# Patient Record
Sex: FEMALE | Employment: OTHER | URBAN - METROPOLITAN AREA
[De-identification: names, ages, dates, MRNs, and addresses within clinical notes are randomized per-mention and may not be internally consistent; named-entity substitution may affect disease eponyms.]

---

## 2017-07-14 ENCOUNTER — HOSPITAL ENCOUNTER (INPATIENT)
Facility: HOSPITAL | Age: 62
LOS: 8 days | Discharge: HOME | DRG: 286 | End: 2017-07-22
Attending: INTERNAL MEDICINE | Admitting: INTERNAL MEDICINE
Payer: COMMERCIAL

## 2017-07-14 DIAGNOSIS — I50.9 CHF (CONGESTIVE HEART FAILURE): ICD-10-CM

## 2017-07-14 DIAGNOSIS — J96.20 ACUTE ON CHRONIC RESPIRATORY FAILURE: ICD-10-CM

## 2017-07-14 DIAGNOSIS — F05 DELIRIUM DUE TO ANOTHER MEDICAL CONDITION, ACUTE, HYPOACTIVE: Primary | ICD-10-CM

## 2017-07-14 DIAGNOSIS — R53.1 WEAKNESS: ICD-10-CM

## 2017-07-14 DIAGNOSIS — J43.2 CENTRILOBULAR EMPHYSEMA: ICD-10-CM

## 2017-07-14 DIAGNOSIS — I27.20 PULMONARY HYPERTENSION: ICD-10-CM

## 2017-07-14 LAB
ALBUMIN SERPL BCP-MCNC: 2.9 G/DL
ALP SERPL-CCNC: 55 U/L
ALT SERPL W/O P-5'-P-CCNC: 20 U/L
ANION GAP SERPL CALC-SCNC: 8 MMOL/L
AST SERPL-CCNC: 12 U/L
BASOPHILS # BLD AUTO: 0 K/UL
BASOPHILS NFR BLD: 0 %
BILIRUB SERPL-MCNC: 0.5 MG/DL
BNP SERPL-MCNC: 157 PG/ML
BUN SERPL-MCNC: 23 MG/DL
CALCIUM SERPL-MCNC: 8.7 MG/DL
CHLORIDE SERPL-SCNC: 87 MMOL/L
CO2 SERPL-SCNC: 43 MMOL/L
CREAT SERPL-MCNC: 0.7 MG/DL
DIFFERENTIAL METHOD: ABNORMAL
EOSINOPHIL # BLD AUTO: 0 K/UL
EOSINOPHIL NFR BLD: 0 %
ERYTHROCYTE [DISTWIDTH] IN BLOOD BY AUTOMATED COUNT: 16.2 %
EST. GFR  (AFRICAN AMERICAN): >60 ML/MIN/1.73 M^2
EST. GFR  (NON AFRICAN AMERICAN): >60 ML/MIN/1.73 M^2
GLUCOSE SERPL-MCNC: 191 MG/DL
HCT VFR BLD AUTO: 37.4 %
HGB BLD-MCNC: 10.9 G/DL
INR PPP: 1.2
LYMPHOCYTES # BLD AUTO: 0.6 K/UL
LYMPHOCYTES NFR BLD: 3.1 %
MAGNESIUM SERPL-MCNC: 2.2 MG/DL
MCH RBC QN AUTO: 26.3 PG
MCHC RBC AUTO-ENTMCNC: 29.1 %
MCV RBC AUTO: 90 FL
MONOCYTES # BLD AUTO: 0.4 K/UL
MONOCYTES NFR BLD: 2.2 %
NEUTROPHILS # BLD AUTO: 18.1 K/UL
NEUTROPHILS NFR BLD: 94.2 %
PHOSPHATE SERPL-MCNC: 3.6 MG/DL
PLATELET # BLD AUTO: 192 K/UL
PMV BLD AUTO: 9.6 FL
POTASSIUM SERPL-SCNC: 3.7 MMOL/L
PROT SERPL-MCNC: 5.7 G/DL
PROTHROMBIN TIME: 12.2 SEC
RBC # BLD AUTO: 4.15 M/UL
SODIUM SERPL-SCNC: 138 MMOL/L
TROPONIN I SERPL DL<=0.01 NG/ML-MCNC: 0.02 NG/ML
WBC # BLD AUTO: 19.19 K/UL

## 2017-07-14 PROCEDURE — 85025 COMPLETE CBC W/AUTO DIFF WBC: CPT

## 2017-07-14 PROCEDURE — 84100 ASSAY OF PHOSPHORUS: CPT

## 2017-07-14 PROCEDURE — 85610 PROTHROMBIN TIME: CPT

## 2017-07-14 PROCEDURE — 25000003 PHARM REV CODE 250: Performed by: STUDENT IN AN ORGANIZED HEALTH CARE EDUCATION/TRAINING PROGRAM

## 2017-07-14 PROCEDURE — 84484 ASSAY OF TROPONIN QUANT: CPT

## 2017-07-14 PROCEDURE — 81003 URINALYSIS AUTO W/O SCOPE: CPT

## 2017-07-14 PROCEDURE — 93010 ELECTROCARDIOGRAM REPORT: CPT | Mod: ,,, | Performed by: INTERNAL MEDICINE

## 2017-07-14 PROCEDURE — 80053 COMPREHEN METABOLIC PANEL: CPT

## 2017-07-14 PROCEDURE — 83735 ASSAY OF MAGNESIUM: CPT

## 2017-07-14 PROCEDURE — 94761 N-INVAS EAR/PLS OXIMETRY MLT: CPT

## 2017-07-14 PROCEDURE — 63600175 PHARM REV CODE 636 W HCPCS: Performed by: STUDENT IN AN ORGANIZED HEALTH CARE EDUCATION/TRAINING PROGRAM

## 2017-07-14 PROCEDURE — 27000221 HC OXYGEN, UP TO 24 HOURS

## 2017-07-14 PROCEDURE — 20000000 HC ICU ROOM

## 2017-07-14 PROCEDURE — 83880 ASSAY OF NATRIURETIC PEPTIDE: CPT

## 2017-07-14 RX ORDER — SODIUM CHLORIDE 0.9 % (FLUSH) 0.9 %
3 SYRINGE (ML) INJECTION EVERY 8 HOURS
Status: DISCONTINUED | OUTPATIENT
Start: 2017-07-14 | End: 2017-07-22 | Stop reason: HOSPADM

## 2017-07-14 RX ORDER — HEPARIN SODIUM 5000 [USP'U]/ML
5000 INJECTION, SOLUTION INTRAVENOUS; SUBCUTANEOUS EVERY 8 HOURS
Status: DISCONTINUED | OUTPATIENT
Start: 2017-07-14 | End: 2017-07-16

## 2017-07-14 RX ORDER — POTASSIUM CHLORIDE 20 MEQ/1
40 TABLET, EXTENDED RELEASE ORAL ONCE
Status: COMPLETED | OUTPATIENT
Start: 2017-07-14 | End: 2017-07-14

## 2017-07-14 RX ORDER — ALBUTEROL SULFATE 0.83 MG/ML
2.5 SOLUTION RESPIRATORY (INHALATION) EVERY 4 HOURS
Status: DISCONTINUED | OUTPATIENT
Start: 2017-07-15 | End: 2017-07-22 | Stop reason: HOSPADM

## 2017-07-14 RX ORDER — FUROSEMIDE 10 MG/ML
80 INJECTION INTRAMUSCULAR; INTRAVENOUS ONCE
Status: COMPLETED | OUTPATIENT
Start: 2017-07-14 | End: 2017-07-14

## 2017-07-14 RX ADMIN — POTASSIUM CHLORIDE 40 MEQ: 1500 TABLET, EXTENDED RELEASE ORAL at 11:07

## 2017-07-14 RX ADMIN — HEPARIN SODIUM 5000 UNITS: 5000 INJECTION, SOLUTION INTRAVENOUS; SUBCUTANEOUS at 09:07

## 2017-07-14 RX ADMIN — FUROSEMIDE 80 MG: 10 INJECTION, SOLUTION INTRAVENOUS at 11:07

## 2017-07-14 RX ADMIN — FUROSEMIDE 10 MG/HR: 10 INJECTION, SOLUTION INTRAVENOUS at 11:07

## 2017-07-14 RX ADMIN — Medication 3 ML: at 09:07

## 2017-07-15 PROBLEM — R09.02 HYPOXIA: Status: ACTIVE | Noted: 2017-07-15

## 2017-07-15 PROBLEM — J43.2 CENTRILOBULAR EMPHYSEMA: Status: ACTIVE | Noted: 2017-07-15

## 2017-07-15 LAB
ALBUMIN SERPL BCP-MCNC: 3.2 G/DL
ALP SERPL-CCNC: 54 U/L
ALT SERPL W/O P-5'-P-CCNC: 22 U/L
ANION GAP SERPL CALC-SCNC: 11 MMOL/L
ANTI SM/RNP ANTIBODY: 0.24 EU
ANTI-SM/RNP INTERPRETATION: NEGATIVE
ANTI-SSA ANTIBODY: 0 EU
ANTI-SSA INTERPRETATION: NEGATIVE
ANTI-SSB ANTIBODY: 0 EU
ANTI-SSB INTERPRETATION: NEGATIVE
AST SERPL-CCNC: 15 U/L
BASOPHILS # BLD AUTO: 0.01 K/UL
BASOPHILS NFR BLD: 0.1 %
BILIRUB SERPL-MCNC: 0.6 MG/DL
BILIRUB UR QL STRIP: NEGATIVE
BUN SERPL-MCNC: 19 MG/DL
C3 SERPL-MCNC: 75 MG/DL
C4 SERPL-MCNC: 9 MG/DL
CALCIUM SERPL-MCNC: 7.9 MG/DL
CCP AB SER IA-ACNC: <0.5 U/ML
CHLORIDE SERPL-SCNC: 87 MMOL/L
CLARITY UR REFRACT.AUTO: CLEAR
CO2 SERPL-SCNC: 45 MMOL/L
COLOR UR AUTO: YELLOW
CREAT SERPL-MCNC: 0.6 MG/DL
DIFFERENTIAL METHOD: ABNORMAL
EOSINOPHIL # BLD AUTO: 0 K/UL
EOSINOPHIL NFR BLD: 0 %
ERYTHROCYTE [DISTWIDTH] IN BLOOD BY AUTOMATED COUNT: 16.2 %
EST. GFR  (AFRICAN AMERICAN): >60 ML/MIN/1.73 M^2
EST. GFR  (NON AFRICAN AMERICAN): >60 ML/MIN/1.73 M^2
ESTIMATED PA SYSTOLIC PRESSURE: 66.04
GLUCOSE SERPL-MCNC: 161 MG/DL
GLUCOSE UR QL STRIP: NEGATIVE
HCT VFR BLD AUTO: 39.8 %
HGB BLD-MCNC: 11.3 G/DL
HGB UR QL STRIP: NEGATIVE
KETONES UR QL STRIP: NEGATIVE
LEUKOCYTE ESTERASE UR QL STRIP: NEGATIVE
LYMPHOCYTES # BLD AUTO: 0.6 K/UL
LYMPHOCYTES NFR BLD: 4.1 %
MAGNESIUM SERPL-MCNC: 2.2 MG/DL
MCH RBC QN AUTO: 25.5 PG
MCHC RBC AUTO-ENTMCNC: 28.4 %
MCV RBC AUTO: 90 FL
MITRAL VALVE REGURGITATION: ABNORMAL
MONOCYTES # BLD AUTO: 0.4 K/UL
MONOCYTES NFR BLD: 2.8 %
NEUTROPHILS # BLD AUTO: 14.2 K/UL
NEUTROPHILS NFR BLD: 92.5 %
NITRITE UR QL STRIP: NEGATIVE
PH UR STRIP: 6 [PH] (ref 5–8)
PHOSPHATE SERPL-MCNC: 3.6 MG/DL
PLATELET # BLD AUTO: 187 K/UL
PMV BLD AUTO: 9.5 FL
POTASSIUM SERPL-SCNC: 3.6 MMOL/L
PROT SERPL-MCNC: 6.1 G/DL
PROT UR QL STRIP: NEGATIVE
RBC # BLD AUTO: 4.43 M/UL
RETIRED EF AND QEF - SEE NOTES: 55 (ref 55–65)
RHEUMATOID FACT SERPL-ACNC: <10 IU/ML
SODIUM SERPL-SCNC: 143 MMOL/L
SP GR UR STRIP: 1.01 (ref 1–1.03)
TRICUSPID VALVE REGURGITATION: ABNORMAL
URN SPEC COLLECT METH UR: NORMAL
UROBILINOGEN UR STRIP-ACNC: NEGATIVE EU/DL
WBC # BLD AUTO: 15.32 K/UL

## 2017-07-15 PROCEDURE — 94761 N-INVAS EAR/PLS OXIMETRY MLT: CPT

## 2017-07-15 PROCEDURE — 87040 BLOOD CULTURE FOR BACTERIA: CPT

## 2017-07-15 PROCEDURE — 25000003 PHARM REV CODE 250: Performed by: STUDENT IN AN ORGANIZED HEALTH CARE EDUCATION/TRAINING PROGRAM

## 2017-07-15 PROCEDURE — 99223 1ST HOSP IP/OBS HIGH 75: CPT | Mod: ,,, | Performed by: INTERNAL MEDICINE

## 2017-07-15 PROCEDURE — 99222 1ST HOSP IP/OBS MODERATE 55: CPT | Mod: ,,, | Performed by: INTERNAL MEDICINE

## 2017-07-15 PROCEDURE — 27000221 HC OXYGEN, UP TO 24 HOURS

## 2017-07-15 PROCEDURE — 86160 COMPLEMENT ANTIGEN: CPT | Mod: 59

## 2017-07-15 PROCEDURE — 20000000 HC ICU ROOM

## 2017-07-15 PROCEDURE — 36600 WITHDRAWAL OF ARTERIAL BLOOD: CPT

## 2017-07-15 PROCEDURE — 83735 ASSAY OF MAGNESIUM: CPT

## 2017-07-15 PROCEDURE — 25000003 PHARM REV CODE 250: Performed by: INTERNAL MEDICINE

## 2017-07-15 PROCEDURE — 86038 ANTINUCLEAR ANTIBODIES: CPT

## 2017-07-15 PROCEDURE — 83516 IMMUNOASSAY NONANTIBODY: CPT

## 2017-07-15 PROCEDURE — 94640 AIRWAY INHALATION TREATMENT: CPT

## 2017-07-15 PROCEDURE — 86200 CCP ANTIBODY: CPT

## 2017-07-15 PROCEDURE — 99900035 HC TECH TIME PER 15 MIN (STAT)

## 2017-07-15 PROCEDURE — 63600175 PHARM REV CODE 636 W HCPCS: Performed by: STUDENT IN AN ORGANIZED HEALTH CARE EDUCATION/TRAINING PROGRAM

## 2017-07-15 PROCEDURE — 94660 CPAP INITIATION&MGMT: CPT

## 2017-07-15 PROCEDURE — 86235 NUCLEAR ANTIGEN ANTIBODY: CPT | Mod: 59

## 2017-07-15 PROCEDURE — 80053 COMPREHEN METABOLIC PANEL: CPT

## 2017-07-15 PROCEDURE — 86235 NUCLEAR ANTIGEN ANTIBODY: CPT

## 2017-07-15 PROCEDURE — 36415 COLL VENOUS BLD VENIPUNCTURE: CPT

## 2017-07-15 PROCEDURE — 85025 COMPLETE CBC W/AUTO DIFF WBC: CPT

## 2017-07-15 PROCEDURE — 82803 BLOOD GASES ANY COMBINATION: CPT

## 2017-07-15 PROCEDURE — 86431 RHEUMATOID FACTOR QUANT: CPT

## 2017-07-15 PROCEDURE — 93306 TTE W/DOPPLER COMPLETE: CPT | Mod: 26,,, | Performed by: INTERNAL MEDICINE

## 2017-07-15 PROCEDURE — 84100 ASSAY OF PHOSPHORUS: CPT

## 2017-07-15 PROCEDURE — 86225 DNA ANTIBODY NATIVE: CPT

## 2017-07-15 PROCEDURE — 27000190 HC CPAP FULL FACE MASK W/VALVE

## 2017-07-15 PROCEDURE — 82595 ASSAY OF CRYOGLOBULIN: CPT

## 2017-07-15 PROCEDURE — 86160 COMPLEMENT ANTIGEN: CPT

## 2017-07-15 PROCEDURE — 93306 TTE W/DOPPLER COMPLETE: CPT

## 2017-07-15 PROCEDURE — 25000242 PHARM REV CODE 250 ALT 637 W/ HCPCS: Performed by: STUDENT IN AN ORGANIZED HEALTH CARE EDUCATION/TRAINING PROGRAM

## 2017-07-15 RX ORDER — FLUTICASONE PROPIONATE AND SALMETEROL 250; 50 UG/1; UG/1
1 POWDER RESPIRATORY (INHALATION) 2 TIMES DAILY
COMMUNITY

## 2017-07-15 RX ORDER — POTASSIUM CHLORIDE 20 MEQ/1
40 TABLET, EXTENDED RELEASE ORAL DAILY
Status: DISCONTINUED | OUTPATIENT
Start: 2017-07-15 | End: 2017-07-18

## 2017-07-15 RX ORDER — PREDNISONE 20 MG/1
20 TABLET ORAL DAILY
COMMUNITY

## 2017-07-15 RX ORDER — FUROSEMIDE 20 MG/1
20 TABLET ORAL 2 TIMES DAILY
Status: ON HOLD | COMMUNITY
End: 2017-07-22 | Stop reason: HOSPADM

## 2017-07-15 RX ORDER — FLUOXETINE 10 MG/1
10 CAPSULE ORAL DAILY
Status: ON HOLD | COMMUNITY
End: 2017-07-22 | Stop reason: HOSPADM

## 2017-07-15 RX ORDER — TIOTROPIUM BROMIDE 18 UG/1
1 CAPSULE ORAL; RESPIRATORY (INHALATION) DAILY
Status: DISCONTINUED | OUTPATIENT
Start: 2017-07-15 | End: 2017-07-22 | Stop reason: HOSPADM

## 2017-07-15 RX ORDER — TIOTROPIUM BROMIDE 18 UG/1
18 CAPSULE ORAL; RESPIRATORY (INHALATION) DAILY
COMMUNITY

## 2017-07-15 RX ORDER — ALBUTEROL SULFATE 90 UG/1
2 AEROSOL, METERED RESPIRATORY (INHALATION) EVERY 6 HOURS PRN
COMMUNITY

## 2017-07-15 RX ORDER — PREDNISONE 20 MG/1
20 TABLET ORAL DAILY
Status: DISCONTINUED | OUTPATIENT
Start: 2017-07-15 | End: 2017-07-15

## 2017-07-15 RX ORDER — FLUOXETINE 10 MG/1
10 CAPSULE ORAL DAILY
Status: DISCONTINUED | OUTPATIENT
Start: 2017-07-15 | End: 2017-07-21

## 2017-07-15 RX ORDER — FLUTICASONE FUROATE AND VILANTEROL 100; 25 UG/1; UG/1
1 POWDER RESPIRATORY (INHALATION) DAILY
Status: DISCONTINUED | OUTPATIENT
Start: 2017-07-15 | End: 2017-07-22 | Stop reason: HOSPADM

## 2017-07-15 RX ORDER — ALBUTEROL SULFATE 0.83 MG/ML
2.5 SOLUTION RESPIRATORY (INHALATION) EVERY 6 HOURS PRN
COMMUNITY

## 2017-07-15 RX ORDER — POTASSIUM CHLORIDE 20 MEQ/1
40 TABLET, EXTENDED RELEASE ORAL ONCE
Status: COMPLETED | OUTPATIENT
Start: 2017-07-15 | End: 2017-07-15

## 2017-07-15 RX ORDER — PREDNISONE 20 MG/1
20 TABLET ORAL DAILY
Status: DISCONTINUED | OUTPATIENT
Start: 2017-07-15 | End: 2017-07-22 | Stop reason: HOSPADM

## 2017-07-15 RX ADMIN — TIOTROPIUM BROMIDE 18 MCG: 18 CAPSULE ORAL; RESPIRATORY (INHALATION) at 08:07

## 2017-07-15 RX ADMIN — POTASSIUM CHLORIDE 40 MEQ: 1500 TABLET, EXTENDED RELEASE ORAL at 01:07

## 2017-07-15 RX ADMIN — HEPARIN SODIUM 5000 UNITS: 5000 INJECTION, SOLUTION INTRAVENOUS; SUBCUTANEOUS at 09:07

## 2017-07-15 RX ADMIN — ALBUTEROL SULFATE 2.5 MG: 2.5 SOLUTION RESPIRATORY (INHALATION) at 11:07

## 2017-07-15 RX ADMIN — VANCOMYCIN HYDROCHLORIDE 1000 MG: 1 INJECTION, POWDER, LYOPHILIZED, FOR SOLUTION INTRAVENOUS at 06:07

## 2017-07-15 RX ADMIN — ALBUTEROL SULFATE 2.5 MG: 2.5 SOLUTION RESPIRATORY (INHALATION) at 08:07

## 2017-07-15 RX ADMIN — HEPARIN SODIUM 5000 UNITS: 5000 INJECTION, SOLUTION INTRAVENOUS; SUBCUTANEOUS at 05:07

## 2017-07-15 RX ADMIN — FLUOXETINE 10 MG: 10 CAPSULE ORAL at 08:07

## 2017-07-15 RX ADMIN — PREDNISONE 20 MG: 20 TABLET ORAL at 08:07

## 2017-07-15 RX ADMIN — Medication 3 ML: at 09:07

## 2017-07-15 RX ADMIN — ALBUTEROL SULFATE 2.5 MG: 2.5 SOLUTION RESPIRATORY (INHALATION) at 12:07

## 2017-07-15 RX ADMIN — FLUTICASONE FUROATE AND VILANTEROL TRIFENATATE 1 PUFF: 100; 25 POWDER RESPIRATORY (INHALATION) at 08:07

## 2017-07-15 RX ADMIN — PIPERACILLIN AND TAZOBACTAM 4.5 G: 4; .5 INJECTION, POWDER, LYOPHILIZED, FOR SOLUTION INTRAVENOUS; PARENTERAL at 11:07

## 2017-07-15 RX ADMIN — ALBUTEROL SULFATE 2.5 MG: 2.5 SOLUTION RESPIRATORY (INHALATION) at 04:07

## 2017-07-15 RX ADMIN — Medication 3 ML: at 05:07

## 2017-07-15 RX ADMIN — HEPARIN SODIUM 5000 UNITS: 5000 INJECTION, SOLUTION INTRAVENOUS; SUBCUTANEOUS at 01:07

## 2017-07-15 RX ADMIN — POTASSIUM CHLORIDE 40 MEQ: 1500 TABLET, EXTENDED RELEASE ORAL at 06:07

## 2017-07-15 NOTE — SUBJECTIVE & OBJECTIVE
Past Medical History:   Diagnosis Date    Arthritis     Cancer     Cervical cancer 1980s - remission    COPD (chronic obstructive pulmonary disease)     3.5 L home O2 (for 8 years)    Emphysema, unspecified     Emphysema/COPD        Past Surgical History:   Procedure Laterality Date    TONSILLECTOMY         Review of patient's allergies indicates:  No Known Allergies    Family History     None        Social History Main Topics    Smoking status: Former Smoker     Types: Cigarettes    Smokeless tobacco: Never Used      Comment: Started at 20years old. Quit 15 years ago    Alcohol use Not on file    Drug use: No    Sexual activity: Not on file      Review of Systems     Positive findings are in BOLD. Otherwise negative ROS as below.     CONSTITUTIONAL: weight loss, fever, chills, weakness or fatigue.   HEENT: visual loss, blurred vision, double vision or yellow sclerae. Ears, Nose, Throat: No hearing loss, sneezing, congestion, runny nose or sore throat.   CARDIOVASCULAR: chest pain, chest pressure or chest discomfort. No palpitations or edema.   RESPIRATORY: shortness of breath, cough or sputum.   GASTROINTESTINAL:anorexia, nausea, vomiting or diarrhea. No abdominal pain or blood.   NEUROLOGICAL: headache, dizziness, syncope, paralysis, ataxia, numbness or tingling in the extremities. No change in bowel or bladder control.   MUSCULOSKELETAL: muscle, back pain, joint pain or stiffness.   HEMATOLOGIC: anemia, bleeding or bruising.   LYMPHATICS: enlarged nodes. No history of splenectomy.   PSYCHIATRIC: history of depression or anxiety.   ENDOCRINOLOGIC: No reports of sweating, cold or heat intolerance. No polyuria or polydipsia.       Objective:     Vital Signs (Most Recent):  Temp: 98.6 °F (37 °C) (07/15/17 1515)  Pulse: 96 (07/15/17 1515)  Resp: (!) 24 (07/15/17 1515)  BP: 123/63 (07/15/17 1515)  SpO2: 100 % (07/15/17 1515) Vital Signs (24h Range):  Temp:  [97.8 °F (36.6 °C)-98.6 °F (37 °C)] 98.6 °F (37  °C)  Pulse:  [] 96  Resp:  [15-62] 24  SpO2:  [96 %-100 %] 100 %  BP: ()/(58-78) 123/63   Weight: 70.5 kg (155 lb 6.8 oz)  Body mass index is 30.35 kg/m².      Intake/Output Summary (Last 24 hours) at 07/15/17 1547  Last data filed at 07/15/17 1515   Gross per 24 hour   Intake           950.57 ml   Output             3550 ml   Net         -2599.43 ml       Physical Exam     Gen: alert and oriented. On venti mask   HEENT: oral mucosa moist, free of lesions, no dental abnormalities, neck free of lymphadenopathy. JVD negative, no eye abnormalities. Pupils appears equal and reactive.   Chest: No wheezing.   Heart: RRR, No M/G/r.   Abdomen: soft, non tender, no masses. No g/r/r  Extremities: Moderate pitting edema noted bilaterally.        Significant Labs:    CBC/Anemia Profile:    Recent Labs  Lab 07/14/17  2050 07/15/17  0336   WBC 19.19* 15.32*   HGB 10.9* 11.3*   HCT 37.4 39.8    187   MCV 90 90   RDW 16.2* 16.2*        Chemistries:    Recent Labs  Lab 07/14/17  2050 07/15/17  0336    143   K 3.7 3.6   CL 87* 87*   CO2 43* 45*   BUN 23* 19   CREATININE 0.7 0.6   CALCIUM 8.7 7.9*   ALBUMIN 2.9* 3.2*   PROT 5.7* 6.1   BILITOT 0.5 0.6   ALKPHOS 55 54*   ALT 20 22   AST 12 15   MG 2.2 2.2   PHOS 3.6 3.6

## 2017-07-15 NOTE — HPI
Mrs. Black is a 62yo woman w/a history of HTN, COPD (tobacco abuse + industrial chemical exposure history; on home oxygen x 12yrs), subsequent cor pulmonale (recnetly diagnosed), DM2, and remote cervical cancer who was originally admitted to an OSH on 7/5/2017 with chills, productive cough (blood tinged), progressive SOB/hypoxia, and BLE edema due to multifocal pneumonia, COPD exacerbation, and HF exacerbation/new RV failure diagnosis. Prior sick contacts included her  with a viral URI in the prior week. She received a 10 day course of parenteral antibiotics (vanc/zosyn/LVQ/flagyl) at the OSH along with steroids for a RLL PNA/COPD exacerbation with improvement in her cough/sputum production but remained SOB, prompting transfer to [facility] on 7/14/2017 for further cardiac care of her R heart failure. ID has been consulted to comment on her antibiotics course for pneumonia. I reviewed her records from the OSH -- she has been afebrile with a downtrending but significant leukocytosis after steroid use for COPD exacerbation. Sputum cultures were not obtained at the OSH but blood cultures were. Her CXR is without lobar infiltrate at present and changes present seem well attributed to HF.

## 2017-07-15 NOTE — ASSESSMENT & PLAN NOTE
62yo woman w/a history of HTN, COPD (tobacco abuse + industrial chemical exposure history; on home oxygen x 12yrs), subsequent cor pulmonale (recnetly diagnosed), DM2, and remote cervical cancer who was originally admitted to an OSH on 7/5/2017 with chills, productive cough (blood tinged), progressive SOB/hypoxia, and BLE edema due to multifocal pneumonia, COPD exacerbation, and HF exacerbation/new RV failure diagnosis. She received an appropriate 10 day course of parenteral antibiotics (vanc/zosyn/LVQ/flagyl) at the OSH along with steroids for a RLL PNA/COPD exacerbation with improvement in her cough/sputum production and I suspect her residual symptoms are due to heart failure. As such, would stop antibiotics at this time.    - I have stopped her antibiotics since she has completed an appropriate course for pneumonia (HCAP)  - further care of her SOB deferred to primary team

## 2017-07-15 NOTE — CONSULTS
[facility]  Infectious Disease  Consult Note    Patient Name: Joy Black  MRN: [id number]  Admission Date: 7/14/2017  Hospital Length of Stay: 1 days  Attending Physician: Erich Medley Jr.,*  Primary Care Provider: Jim Hernandez MD     Isolation Status: No active isolations    Patient information was obtained from patient, spouse/SO and law enforcement.      Inpatient consult to Infectious Diseases  Consult performed by: ALANNAH CONWAY  Consult ordered by: HERMILO DINERO  Reason for consult: pneumonia rx        Assessment/Plan:     Hypoxia    60yo woman w/a history of HTN, COPD (tobacco abuse + industrial chemical exposure history; on home oxygen x 12yrs), subsequent cor pulmonale (recnetly diagnosed), DM2, and remote cervical cancer who was originally admitted to an OSH on 7/5/2017 with chills, productive cough (blood tinged), progressive SOB/hypoxia, and BLE edema due to multifocal pneumonia, COPD exacerbation, and HF exacerbation/new RV failure diagnosis. She received an appropriate 10 day course of parenteral antibiotics (vanc/zosyn/LVQ/flagyl) at the OSH along with steroids for a RLL PNA/COPD exacerbation with improvement in her cough/sputum production and I suspect her residual symptoms are due to heart failure. As such, would stop antibiotics at this time.    - I have stopped her antibiotics since she has completed an appropriate course for pneumonia (HCAP)  - further care of her SOB deferred to primary team            Thank you for your consult. I will sign off. Please contact us if you have any additional questions.     Alannah Conway MD  ID Attending  [phone]    Alannah Conway MD  Infectious Disease  [facility]    Subjective:     Principal Problem: <principal problem not specified>    HPI: Mrs. Black is a 60yo woman w/a history of HTN, COPD (tobacco abuse + industrial chemical exposure history; on home oxygen x 12yrs), subsequent cor  pulmonale (recnetly diagnosed), DM2, and remote cervical cancer who was originally admitted to an OSH on 7/5/2017 with chills, productive cough (blood tinged), progressive SOB/hypoxia, and BLE edema due to multifocal pneumonia, COPD exacerbation, and HF exacerbation/new RV failure diagnosis. Prior sick contacts included her  with a viral URI in the prior week. She received a 10 day course of parenteral antibiotics (vanc/zosyn/LVQ/flagyl) at the OSH along with steroids for a RLL PNA/COPD exacerbation with improvement in her cough/sputum production but remained SOB, prompting transfer to [facility] on 7/14/2017 for further cardiac care of her R heart failure. ID has been consulted to comment on her antibiotics course for pneumonia. I reviewed her records from the OSH -- she has been afebrile with a downtrending but significant leukocytosis after steroid use for COPD exacerbation. Sputum cultures were not obtained at the OSH but blood cultures were. Her CXR is without lobar infiltrate at present and changes present seem well attributed to HF.    Past Medical History:   Diagnosis Date    Arthritis     Cancer     Cervical cancer 1980s - remission    COPD (chronic obstructive pulmonary disease)     3.5 L home O2 (for 8 years)    Emphysema, unspecified     Emphysema/COPD        Past Surgical History:   Procedure Laterality Date    TONSILLECTOMY         Review of patient's allergies indicates:  No Known Allergies    Medications:  Prescriptions Prior to Admission   Medication Sig    albuterol (PROVENTIL) 2.5 mg /3 mL (0.083 %) nebulizer solution Take 2.5 mg by nebulization every 6 (six) hours as needed for Wheezing or Shortness of Breath. Rescue    albuterol (VENTOLIN HFA) 90 mcg/actuation inhaler Inhale 2 puffs into the lungs every 6 (six) hours as needed for Wheezing. Rescue    fluoxetine (PROZAC) 10 MG capsule Take 10 mg by mouth once daily.    fluticasone-salmeterol 250-50 mcg/dose (ADVAIR) 250-50 mcg/dose  diskus inhaler Inhale 1 puff into the lungs 2 (two) times daily. Controller    furosemide (LASIX) 20 MG tablet Take 20 mg by mouth 2 (two) times daily.    predniSONE (DELTASONE) 20 MG tablet Take 20 mg by mouth once daily.    tiotropium (SPIRIVA) 18 mcg inhalation capsule Inhale 18 mcg into the lungs once daily. Controller     Antibiotics     Start     Stop Route Frequency Ordered    07/15/17 0630  vancomycin 1 g in dextrose 5 % 250 mL IVPB (ready to mix system)  (Vancomycin IVPB with levels panel)      -- IV Every 12 hours (non-standard times) 07/15/17 0521    07/15/17 0521  piperacillin-tazobactam 4.5 g in dextrose 5 % 100 mL IVPB (ready to mix system)      -- IV Every 6 hours (non-standard times) 07/15/17 0521        Antifungals     None        Antivirals     None             There is no immunization history on file for this patient.    Family History     None        Social History     Social History    Marital status:      Spouse name: N/A    Number of children: N/A    Years of education: N/A     Social History Main Topics    Smoking status: Former Smoker     Types: Cigarettes    Smokeless tobacco: Never Used      Comment: Started at 20years old. Quit 15 years ago    Alcohol use None    Drug use: No    Sexual activity: Not Asked     Other Topics Concern    None     Social History Narrative    None     Review of Systems   Constitutional: Positive for activity change. Negative for appetite change, chills, diaphoresis and fever.   HENT: Negative for ear pain, mouth sores, sinus pressure and sore throat.    Eyes: Negative for photophobia, pain and redness.   Respiratory: Positive for shortness of breath. Negative for cough and wheezing.    Cardiovascular: Positive for leg swelling. Negative for chest pain.   Gastrointestinal: Negative for abdominal distention, abdominal pain, diarrhea and nausea.   Genitourinary: Negative for dysuria, flank pain, frequency and urgency.   Musculoskeletal: Negative  for arthralgias, back pain, gait problem and myalgias.   Skin: Negative for pallor and rash.   Neurological: Negative for dizziness, tremors, seizures and headaches.   Psychiatric/Behavioral: Negative for confusion.     Objective:     Vital Signs (Most Recent):  Temp: 98.6 °F (37 °C) (07/15/17 1515)  Pulse: 108 (07/15/17 1603)  Resp: 20 (07/15/17 1603)  BP: 123/63 (07/15/17 1515)  SpO2: 100 % (07/15/17 1603) Vital Signs (24h Range):  Temp:  [97.8 °F (36.6 °C)-98.6 °F (37 °C)] 98.6 °F (37 °C)  Pulse:  [] 108  Resp:  [15-62] 20  SpO2:  [96 %-100 %] 100 %  BP: ()/(58-78) 123/63     Weight: 70.5 kg (155 lb 6.8 oz)  Body mass index is 30.35 kg/m².    Estimated Creatinine Clearance: 88.5 mL/min (based on Cr of 0.6).    Physical Exam   Constitutional: She is oriented to person, place, and time. She appears well-developed.   Chronically ill appearing due to lung disease.   HENT:   Head: Atraumatic.   Mouth/Throat: Oropharynx is clear and moist. No oropharyngeal exudate.   Eyes: Conjunctivae and EOM are normal. Pupils are equal, round, and reactive to light. No scleral icterus.   Neck: Neck supple.   Cardiovascular: Normal rate and regular rhythm.  Exam reveals no friction rub.    Murmur heard.  Pulmonary/Chest: No respiratory distress. She has no wheezes. She has rales. She exhibits no tenderness.   Mildly tachypneic on face mask. Diffuse scant crackles and decreased in bases.   Abdominal: Soft. Bowel sounds are normal. She exhibits no distension. There is no tenderness. There is no rebound and no guarding.   Musculoskeletal: Normal range of motion. She exhibits edema.   Lymphadenopathy:     She has no cervical adenopathy.   Neurological: She is alert and oriented to person, place, and time. No cranial nerve deficit. She exhibits normal muscle tone. Coordination normal.   Skin: No rash noted. No erythema.       Significant Labs:   CBC:   Recent Labs  Lab 07/14/17  2050 07/15/17  0336   WBC 19.19* 15.32*   HGB  10.9* 11.3*   HCT 37.4 39.8    187     CMP:   Recent Labs  Lab 07/14/17  2050 07/15/17  0336    143   K 3.7 3.6   CL 87* 87*   CO2 43* 45*   * 161*   BUN 23* 19   CREATININE 0.7 0.6   CALCIUM 8.7 7.9*   PROT 5.7* 6.1   ALBUMIN 2.9* 3.2*   BILITOT 0.5 0.6   ALKPHOS 55 54*   AST 12 15   ALT 20 22   ANIONGAP 8 11   EGFRNONAA >60.0 >60.0       Significant Imaging: I have reviewed all pertinent imaging results/findings within the past 24 hours.     CXR: Results: Study limited by portable technique. There is coarse interstitial opacities within the lower lobes the lungs bilaterally suggestive for scarring component of interstitial edema not excluded although felt less likely. There is no large pleural effusion or pneumothorax. Clinical correlation and followup advised.    Microbiology:  OSH data reviewed -- negative blood cx

## 2017-07-15 NOTE — H&P
Heart Failure and Transplant Service Admission Note  Attending Physician: Erich Medley Jr.,*  Chief Complaint: Transfer for severe PH, cor pulmonale     HPI:   61 y.o. woman with PMH of COPD on home O2 (12 years), DM, remote cervical Ca, HTN, smoker (1-2 PPD x 32 years) who is a transfer from [facility] for evaluation of severe PH with right heart failure.    The patient was admitted to the OSH on 7/5/17 for having low O2 sats (49 - 60% at home), SOB for 2 weeks PTP, progressive leg swelling, and cough productive of bloody sputum. She had been suffering from sinusitis/viral URTI symptoms prior to this. Her  reports asking her to go to the hospital earlier however she received due to scare of being intubated and being put on a ventilator. While in the hospital she was found to diagnosed with COPD exacerbation and had courses of IV solumedrol. She was also found to have RLL PNA and was treated with BS ABx (vanc/zoysn/levofloxacin/flagyl) as well as IV fluids. During this time she was also found to have RHF and was treated with IV lasix. She had been put on BiPAP at the OSH as well. Her TTE there revealed LVEF >66%, G1DD, LVEDD 3.8 cm, RV systolic dysf (TAPSE 1.1, RVS' 7), mild-mod MR, mod-severe TR, PASP 81, RAP 15. Her RHC on 7/11/17 revealed RA 18, /40, /38 (mean 71), PWP 12, CO 7.65, CI 4.27. 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 She was subsequently transferred to [facility] for further evaluation.    Upon arrival to [facility], she was tachycardic (110s; sinus), RR 27, /78, 100% on 10 L venti mask. She was in no acute distress. Diffusely volume overloaded on exam. Lung exam revealed decreased air entry w/o active wheeze/rhonchi. WBC 19.19 (while on steroids and lower than 21.7 at OSH),  Hb 10.9, INR 1.2, K 3.7, CO2 43, Cr 0.7, AST 12, ALT 20, T bili 0.5, MG 2.2, , Tn 0.020.     She reports in addition to chronic smoking, she was also exposed to industrial chemicals 12 years ago in [place], [place] when the town had been evacuated. After this, her respiratory symptoms had surfaced. She reports compliance with all her inhalers. Reports quitting smoking 15 years ago. Denies ETOH or drug use. Also her and her  used to have 140 birds as pets, including parrots. In addition prior to rapid worsening of her symptoms 2 weeks PTP to the OSH, she had been noticing increased O2 requirements from 2LNC up to 5LNC in the last 6 months.     ROS:    Constitution: Negative for fever, chills, weight loss or gain.   HENT: Negative for sore throat, rhinorrhea, or headache.  Eyes: Negative for blurred or double vision.   Cardiovascular: See above  Pulmonary: Positive for SOB   Gastrointestinal: Negative for abdominal pain, nausea, vomiting, or diarrhea.   : Negative for dysuria.   Neurological: Negative for focal weakness or sensory changes.  PMH:     Past Medical History:   Diagnosis Date    Arthritis     Cancer     Cervical cancer 1980s - remission    COPD (chronic obstructive pulmonary disease)     3.5 L home O2 (for 8 years)    Emphysema, unspecified     Emphysema/COPD      Past Surgical History:   Procedure Laterality Date    TONSILLECTOMY       Allergies:   Review of patient's allergies indicates:  No Known Allergies  Medications:     No current facility-administered medications on file prior to encounter.      No current outpatient prescriptions on file prior to encounter.       Inpatient Medications   Continuous Infusions:   furosemide (LASIX) 5 mg/mL infusion (non-titrating) 10 mg/hr (07/15/17 0500)     Scheduled Meds:   albuterol sulfate  2.5 mg Nebulization Q4H    fluoxetine  10 mg Oral Daily    fluticasone-vilanterol  1 puff Inhalation Daily    heparin (porcine)  5,000 Units  Subcutaneous Q8H    predniSONE  20 mg Oral Daily    sodium chloride 0.9%  3 mL Intravenous Q8H    tiotropium  1 capsule Inhalation Daily     PRN Meds:     Social History:     Social History   Substance Use Topics    Smoking status: Former Smoker     Types: Cigarettes    Smokeless tobacco: Never Used      Comment: Started at 20years old. Quit 15 years ago    Alcohol use Not on file     Family History:   History reviewed. No pertinent family history.  Physical Exam:     Vitals:  Temp:  [97.8 °F (36.6 °C)-98.5 °F (36.9 °C)]   Pulse:  []   Resp:  [15-27]   BP: (106-145)/(58-78)   SpO2:  [99 %-100 %]     /63   Pulse 89   Temp 97.9 °F (36.6 °C) (Oral)   Resp 17   Ht 5' (1.524 m)   Wt 70.5 kg (155 lb 6.8 oz)   SpO2 100%   Breastfeeding? No   BMI 30.35 kg/m²   I/O's:    Intake/Output Summary (Last 24 hours) at 07/15/17 0505  Last data filed at 07/15/17 0500   Gross per 24 hour   Intake           130.07 ml   Output             3200 ml   Net         -3069.93 ml       Wt Readings from Last 10 Encounters:   07/15/17 70.5 kg (155 lb 6.8 oz)        Constitutional: NAD, conversant  HEENT: Sclera anicteric, PERRLA, EOMI  Neck: Positive JVD, no carotid bruits  CV: Regular rhythm, Tachycardic, no murmur, S1/S2 with loud P2 component, +S3 No Pericardial rub  Pulm: decreased air entry B/L  GI: Abdomen soft, NTND, +BS  Extremities: 4+ LE edema, warm and well perfused, No cyanosis  Skin: No ecchymosis, erythema, or ulcers  Psych: AOx3, appropriate affect  Neuro: CNII-XII intact, no focal deficits      Labs:       Recent Labs  Lab 07/14/17 2050      K 3.7   CL 87*   CO2 43*   BUN 23*   CREATININE 0.7   ANIONGAP 8       Recent Labs  Lab 07/14/17 2050   AST 12   ALT 20   ALKPHOS 55   BILITOT 0.5   ALBUMIN 2.9*       Recent Labs  Lab 07/14/17 2050   TROPONINI 0.020   *     No results for input(s): PH, PCO2, PO2, HCO3, POCSATURATED in the last 168 hours.   Recent Labs  Lab 07/14/17 2050  07/15/17  0336   WBC 19.19* 15.32*   HGB 10.9* 11.3*   HCT 37.4 39.8    187   GRAN 94.2*  18.1* 92.5*  14.2*       Recent Labs  Lab 07/14/17  2050   INR 1.2     No results found for: CHOL, HDL, LDLCALC, TRIG  No results found for: HGBA1C, TSH     Micro:  Blood Cultures  No results found for: LABBLOO  Urine Cultures  No results found for: LABURIN    Imaging:   CXR: Hyperinflated lungs, RLL opacity, pulmonary vascular prominence, B/L interstitial prominence    TTE pending; see HPI for OSH TTE.      EKG: Sinus tachycardia, RAD, RVH, CHRISTOPHE        Assessment:   61 y.o. woman with PMH of COPD on home O2 (12 years), DM, remote cervical Ca, HTN, smoker (1-2 PPD x 32 years) who is a transfer from [facility] for evaluation of severe PH with right heart failure.    Plan:   Cor pulmonale / RHF secondary to severe PH - WHO III / COPD exacerbation 2/2 Pneumonia  - Admit to HTS  - nebs ATC/PRN  - c/w prednisone  - Pulmonary / ID evaluation  - BS IV ABx with vanc/zosyn  - IV diuresis with lasix 80 mg IVP then drip at 10 mg/hr  - TTE w/ CFD   - 2 gm NA/1500 mL fluid restric/ I/Os / Dw  - PH work- up  - PFTs/ AI connective tissue dz antibodies / VQ scan / TTE  - c/w ICS/LABA    Poor prognosis; spoke with the patient and  to discuss advanced directives.  Case discussed with attending.      Signed:  Florina De La Torre MD  Cardiology Fellow  Pager: [phone]  7/15/2017 5:05 AM

## 2017-07-15 NOTE — HPI
62 y/o Female with h/o emphysema (on 3-3.5L NC), DM, former smoker (quit 15 years ago), who is transferred here for evaluation of right heart failure.     Per history obtained from patient and chart review, She was admitted to OSH for sx of SOB where she was diagnosed with R sided PNA. Tx with abx and fluids before she was diagnosed with having right heart failure. She has since been diuresed aggressively with a furosemide infusion. Underwent a RHC on 7/11/17 with severely elevated right heart pressures and is transferred here for PH therapies and right heart failure.     Upon questioning patient about her SOb, she notes that although she feels better, she is still not back to baseline. She normally uses 3-3.5L at home and has been on oxygen for the past 8 years. Her oxygen requirement has increased slowly over the last several years. She uses advair 250/50 BID, spiriva and albuterol PRN (usually 4x/daily) for her COPD. Over the last couple of months she has been using prednisone 40 mg that has now been tapered to 20 mg. Currently she is still significantly SOB, has an occasional cough that is not really productive.

## 2017-07-15 NOTE — PROGRESS NOTES
Patient seen and examined at bedside this am  Still in decomp hf  W/u for phtn underway  Diurese over wknd  Follow up test results  Case discussed with staff

## 2017-07-15 NOTE — PROGRESS NOTES
Patient arrived from outlying facility via EMS on 50% [facility] mask[facility] Pt hooked up to monitor - VSS with ST 100s; O2 sat 100%. Pt switched over to 50% venti mask per RT. Pt tolerated well. No s/s of anxiety reported and pain denied. HTS notified of patient's arrival. Labs collected and sent; results pending. EKG completed. Awaiting Chest Xray and ECHO to be completed. Pt due to void for UA.

## 2017-07-15 NOTE — SUBJECTIVE & OBJECTIVE
Past Medical History:   Diagnosis Date    Arthritis     Cancer     Cervical cancer 1980s - remission    COPD (chronic obstructive pulmonary disease)     3.5 L home O2 (for 8 years)    Emphysema, unspecified     Emphysema/COPD        Past Surgical History:   Procedure Laterality Date    TONSILLECTOMY         Review of patient's allergies indicates:  No Known Allergies    Medications:  Prescriptions Prior to Admission   Medication Sig    albuterol (PROVENTIL) 2.5 mg /3 mL (0.083 %) nebulizer solution Take 2.5 mg by nebulization every 6 (six) hours as needed for Wheezing or Shortness of Breath. Rescue    albuterol (VENTOLIN HFA) 90 mcg/actuation inhaler Inhale 2 puffs into the lungs every 6 (six) hours as needed for Wheezing. Rescue    fluoxetine (PROZAC) 10 MG capsule Take 10 mg by mouth once daily.    fluticasone-salmeterol 250-50 mcg/dose (ADVAIR) 250-50 mcg/dose diskus inhaler Inhale 1 puff into the lungs 2 (two) times daily. Controller    furosemide (LASIX) 20 MG tablet Take 20 mg by mouth 2 (two) times daily.    predniSONE (DELTASONE) 20 MG tablet Take 20 mg by mouth once daily.    tiotropium (SPIRIVA) 18 mcg inhalation capsule Inhale 18 mcg into the lungs once daily. Controller     Antibiotics     Start     Stop Route Frequency Ordered    07/15/17 0630  vancomycin 1 g in dextrose 5 % 250 mL IVPB (ready to mix system)  (Vancomycin IVPB with levels panel)      -- IV Every 12 hours (non-standard times) 07/15/17 0521    07/15/17 0521  piperacillin-tazobactam 4.5 g in dextrose 5 % 100 mL IVPB (ready to mix system)      -- IV Every 6 hours (non-standard times) 07/15/17 0521        Antifungals     None        Antivirals     None             There is no immunization history on file for this patient.    Family History     None        Social History     Social History    Marital status:      Spouse name: N/A    Number of children: N/A    Years of education: N/A     Social History Main Topics     Smoking status: Former Smoker     Types: Cigarettes    Smokeless tobacco: Never Used      Comment: Started at 20years old. Quit 15 years ago    Alcohol use None    Drug use: No    Sexual activity: Not Asked     Other Topics Concern    None     Social History Narrative    None     Review of Systems   Constitutional: Positive for activity change. Negative for appetite change, chills, diaphoresis and fever.   HENT: Negative for ear pain, mouth sores, sinus pressure and sore throat.    Eyes: Negative for photophobia, pain and redness.   Respiratory: Positive for shortness of breath. Negative for cough and wheezing.    Cardiovascular: Positive for leg swelling. Negative for chest pain.   Gastrointestinal: Negative for abdominal distention, abdominal pain, diarrhea and nausea.   Genitourinary: Negative for dysuria, flank pain, frequency and urgency.   Musculoskeletal: Negative for arthralgias, back pain, gait problem and myalgias.   Skin: Negative for pallor and rash.   Neurological: Negative for dizziness, tremors, seizures and headaches.   Psychiatric/Behavioral: Negative for confusion.     Objective:     Vital Signs (Most Recent):  Temp: 98.6 °F (37 °C) (07/15/17 1515)  Pulse: 108 (07/15/17 1603)  Resp: 20 (07/15/17 1603)  BP: 123/63 (07/15/17 1515)  SpO2: 100 % (07/15/17 1603) Vital Signs (24h Range):  Temp:  [97.8 °F (36.6 °C)-98.6 °F (37 °C)] 98.6 °F (37 °C)  Pulse:  [] 108  Resp:  [15-62] 20  SpO2:  [96 %-100 %] 100 %  BP: ()/(58-78) 123/63     Weight: 70.5 kg (155 lb 6.8 oz)  Body mass index is 30.35 kg/m².    Estimated Creatinine Clearance: 88.5 mL/min (based on Cr of 0.6).    Physical Exam   Constitutional: She is oriented to person, place, and time. She appears well-developed.   Chronically ill appearing due to lung disease.   HENT:   Head: Atraumatic.   Mouth/Throat: Oropharynx is clear and moist. No oropharyngeal exudate.   Eyes: Conjunctivae and EOM are normal. Pupils are equal, round, and  reactive to light. No scleral icterus.   Neck: Neck supple.   Cardiovascular: Normal rate and regular rhythm.  Exam reveals no friction rub.    Murmur heard.  Pulmonary/Chest: No respiratory distress. She has no wheezes. She has rales. She exhibits no tenderness.   Mildly tachypneic on face mask. Diffuse scant crackles and decreased in bases.   Abdominal: Soft. Bowel sounds are normal. She exhibits no distension. There is no tenderness. There is no rebound and no guarding.   Musculoskeletal: Normal range of motion. She exhibits edema.   Lymphadenopathy:     She has no cervical adenopathy.   Neurological: She is alert and oriented to person, place, and time. No cranial nerve deficit. She exhibits normal muscle tone. Coordination normal.   Skin: No rash noted. No erythema.       Significant Labs:   CBC:   Recent Labs  Lab 07/14/17  2050 07/15/17  0336   WBC 19.19* 15.32*   HGB 10.9* 11.3*   HCT 37.4 39.8    187     CMP:   Recent Labs  Lab 07/14/17  2050 07/15/17  0336    143   K 3.7 3.6   CL 87* 87*   CO2 43* 45*   * 161*   BUN 23* 19   CREATININE 0.7 0.6   CALCIUM 8.7 7.9*   PROT 5.7* 6.1   ALBUMIN 2.9* 3.2*   BILITOT 0.5 0.6   ALKPHOS 55 54*   AST 12 15   ALT 20 22   ANIONGAP 8 11   EGFRNONAA >60.0 >60.0       Significant Imaging: I have reviewed all pertinent imaging results/findings within the past 24 hours.     CXR: Results: Study limited by portable technique. There is coarse interstitial opacities within the lower lobes the lungs bilaterally suggestive for scarring component of interstitial edema not excluded although felt less likely. There is no large pleural effusion or pneumothorax. Clinical correlation and followup advised.    Microbiology:  OSH data reviewed -- negative blood cx

## 2017-07-15 NOTE — ASSESSMENT & PLAN NOTE
Currently suspected to be Group 3 secondary to COPD.     However, typically COPD not associated with very high pulmonary pressures. When present, pulmonary HTN is usually mild.   Additionally, Chest Ct from [facility] with findings of pulmonary edema. Emphysema noted on CT is also impressive enough to entirely explain PH.     Consider repeat RHC  And eval for other contributors to pulmonary HTN   If want to eval for other interstitial lung diseases as contributor to PH, could repeat CT after adequate diuresis achieved.

## 2017-07-15 NOTE — CONSULTS
[facility]  Critical Care Medicine  Consult Note    Patient Name: Joy Black  MRN: [id number]  Admission Date: 7/14/2017  Hospital Length of Stay: 1 days  Code Status: Full Code  Attending Physician: Erich Medley Jr.,*   Primary Care Provider: Jim Hernandez MD   Principal Problem: <principal problem not specified>    Consults  Subjective:     HPI:  62 y/o Female with h/o emphysema (on 3-3.5L NC), DM, former smoker (quit 15 years ago), who is transferred here for evaluation of right heart failure.     Per history obtained from patient and chart review, She was admitted to OSH for sx of SOB where she was diagnosed with R sided PNA. Tx with abx and fluids before she was diagnosed with having right heart failure. She has since been diuresed aggressively with a furosemide infusion. Underwent a RHC on 7/11/17 with severely elevated right heart pressures and is transferred here for PH therapies and right heart failure.     Upon questioning patient about her SOb, she notes that although she feels better, she is still not back to baseline. She normally uses 3-3.5L at home and has been on oxygen for the past 8 years. Her oxygen requirement has increased slowly over the last several years. She uses advair 250/50 BID, spiriva and albuterol PRN (usually 4x/daily) for her COPD. Over the last couple of months she has been using prednisone 40 mg that has now been tapered to 20 mg. Currently she is still significantly SOB, has an occasional cough that is not really productive.     Hospital/ICU Course:  No notes on file    Past Medical History:   Diagnosis Date    Arthritis     Cancer     Cervical cancer 1980s - remission    COPD (chronic obstructive pulmonary disease)     3.5 L home O2 (for 8 years)    Emphysema, unspecified     Emphysema/COPD        Past Surgical History:   Procedure Laterality Date    TONSILLECTOMY         Review of patient's allergies indicates:  No Known Allergies    Family  History     None        Social History Main Topics    Smoking status: Former Smoker     Types: Cigarettes    Smokeless tobacco: Never Used      Comment: Started at 20years old. Quit 15 years ago    Alcohol use Not on file    Drug use: No    Sexual activity: Not on file      Review of Systems     Positive findings are in BOLD. Otherwise negative ROS as below.     CONSTITUTIONAL: weight loss, fever, chills, weakness or fatigue.   HEENT: visual loss, blurred vision, double vision or yellow sclerae. Ears, Nose, Throat: No hearing loss, sneezing, congestion, runny nose or sore throat.   CARDIOVASCULAR: chest pain, chest pressure or chest discomfort. No palpitations or edema.   RESPIRATORY: shortness of breath, cough or sputum.   GASTROINTESTINAL:anorexia, nausea, vomiting or diarrhea. No abdominal pain or blood.   NEUROLOGICAL: headache, dizziness, syncope, paralysis, ataxia, numbness or tingling in the extremities. No change in bowel or bladder control.   MUSCULOSKELETAL: muscle, back pain, joint pain or stiffness.   HEMATOLOGIC: anemia, bleeding or bruising.   LYMPHATICS: enlarged nodes. No history of splenectomy.   PSYCHIATRIC: history of depression or anxiety.   ENDOCRINOLOGIC: No reports of sweating, cold or heat intolerance. No polyuria or polydipsia.       Objective:     Vital Signs (Most Recent):  Temp: 98.6 °F (37 °C) (07/15/17 1515)  Pulse: 96 (07/15/17 1515)  Resp: (!) 24 (07/15/17 1515)  BP: 123/63 (07/15/17 1515)  SpO2: 100 % (07/15/17 1515) Vital Signs (24h Range):  Temp:  [97.8 °F (36.6 °C)-98.6 °F (37 °C)] 98.6 °F (37 °C)  Pulse:  [] 96  Resp:  [15-62] 24  SpO2:  [96 %-100 %] 100 %  BP: ()/(58-78) 123/63   Weight: 70.5 kg (155 lb 6.8 oz)  Body mass index is 30.35 kg/m².      Intake/Output Summary (Last 24 hours) at 07/15/17 1547  Last data filed at 07/15/17 1515   Gross per 24 hour   Intake           950.57 ml   Output             3550 ml   Net         -2599.43 ml       Physical Exam      Gen: alert and oriented. On venti mask   HEENT: oral mucosa moist, free of lesions, no dental abnormalities, neck free of lymphadenopathy. JVD negative, no eye abnormalities. Pupils appears equal and reactive.   Chest: No wheezing.   Heart: RRR, No M/G/r.   Abdomen: soft, non tender, no masses. No g/r/r  Extremities: Moderate pitting edema noted bilaterally.        Significant Labs:    CBC/Anemia Profile:    Recent Labs  Lab 07/14/17  2050 07/15/17  0336   WBC 19.19* 15.32*   HGB 10.9* 11.3*   HCT 37.4 39.8    187   MCV 90 90   RDW 16.2* 16.2*        Chemistries:    Recent Labs  Lab 07/14/17  2050 07/15/17  0336    143   K 3.7 3.6   CL 87* 87*   CO2 43* 45*   BUN 23* 19   CREATININE 0.7 0.6   CALCIUM 8.7 7.9*   ALBUMIN 2.9* 3.2*   PROT 5.7* 6.1   BILITOT 0.5 0.6   ALKPHOS 55 54*   ALT 20 22   AST 12 15   MG 2.2 2.2   PHOS 3.6 3.6         Assessment/Plan:     Pulmonary   Centrilobular emphysema    Former smoker quit >8 years ago according to patient. No PFT's in chart to review.   Baseline meds: Advair 250/50, spiriva, albuterol, prednisone.     Reviewed patients CT scan- Doubt that she really had PNA based on CT pattern- which is more suggestive of pulmonary edema. CXR here also relatively clear.     Hence do not suspect she has an active infection. Could consider checking procalcitonin and discontinue abx. (Mild leukocytosis likely from prednisone).   Currently with clear lungs and could continue baseline home COPD meds (spiriva, breo- substitute for advair, prednisone 20, nebulizers).           Pulmonary hypertension    Currently suspected to be Group 3 secondary to COPD.     However, typically COPD not associated with very high pulmonary pressures. When present, pulmonary HTN is usually mild.   Additionally, Chest Ct from [facility] hospital with findings of pulmonary edema. Emphysema noted on CT is also impressive enough to entirely explain PH.     Consider repeat RHC  And eval for other contributors  to pulmonary HTN   If want to eval for other interstitial lung diseases as contributor to PH, could repeat CT after adequate diuresis achieved.                 Thank you for your consult. I will follow-up with patient. Please contact us if you have any additional questions.     Fay Garibay, DO  Critical Care Medicine  [facility]

## 2017-07-15 NOTE — PROGRESS NOTES
Pt was received from EMS and was placed on Venti mask with the previous settings: 12L and 50%. Possible BiPAP tonight. Will continue to monitor.

## 2017-07-16 LAB
ALBUMIN SERPL BCP-MCNC: 3 G/DL
ALP SERPL-CCNC: 56 U/L
ALT SERPL W/O P-5'-P-CCNC: 20 U/L
ANION GAP SERPL CALC-SCNC: 9 MMOL/L
ANISOCYTOSIS BLD QL SMEAR: SLIGHT
AST SERPL-CCNC: 14 U/L
BASO STIPL BLD QL SMEAR: ABNORMAL
BASOPHILS # BLD AUTO: 0.01 K/UL
BASOPHILS NFR BLD: 0 %
BILIRUB SERPL-MCNC: 0.7 MG/DL
BUN SERPL-MCNC: 15 MG/DL
CALCIUM SERPL-MCNC: 8.4 MG/DL
CHLORIDE SERPL-SCNC: 83 MMOL/L
CO2 SERPL-SCNC: 48 MMOL/L
CREAT SERPL-MCNC: 0.5 MG/DL
DIFFERENTIAL METHOD: ABNORMAL
EOSINOPHIL # BLD AUTO: 0 K/UL
EOSINOPHIL NFR BLD: 0 %
ERYTHROCYTE [DISTWIDTH] IN BLOOD BY AUTOMATED COUNT: 16.1 %
EST. GFR  (AFRICAN AMERICAN): >60 ML/MIN/1.73 M^2
EST. GFR  (NON AFRICAN AMERICAN): >60 ML/MIN/1.73 M^2
GLUCOSE SERPL-MCNC: 109 MG/DL
HCT VFR BLD AUTO: 39.4 %
HGB BLD-MCNC: 11.6 G/DL
LYMPHOCYTES # BLD AUTO: 0.5 K/UL
LYMPHOCYTES NFR BLD: 2.1 %
MAGNESIUM SERPL-MCNC: 2.2 MG/DL
MCH RBC QN AUTO: 26.1 PG
MCHC RBC AUTO-ENTMCNC: 29.4 %
MCV RBC AUTO: 89 FL
MONOCYTES # BLD AUTO: 0.9 K/UL
MONOCYTES NFR BLD: 4.4 %
NEUTROPHILS # BLD AUTO: 20 K/UL
NEUTROPHILS NFR BLD: 93.5 %
PHOSPHATE SERPL-MCNC: 2.8 MG/DL
PLATELET # BLD AUTO: 184 K/UL
PLATELET BLD QL SMEAR: ABNORMAL
PMV BLD AUTO: 9.3 FL
POCT GLUCOSE: 110 MG/DL (ref 70–110)
POLYCHROMASIA BLD QL SMEAR: ABNORMAL
POTASSIUM SERPL-SCNC: 3 MMOL/L
POTASSIUM SERPL-SCNC: 4.2 MMOL/L
PROT SERPL-MCNC: 5.9 G/DL
RBC # BLD AUTO: 4.45 M/UL
SODIUM SERPL-SCNC: 140 MMOL/L
WBC # BLD AUTO: 21.58 K/UL

## 2017-07-16 PROCEDURE — 94660 CPAP INITIATION&MGMT: CPT

## 2017-07-16 PROCEDURE — 25000003 PHARM REV CODE 250: Performed by: STUDENT IN AN ORGANIZED HEALTH CARE EDUCATION/TRAINING PROGRAM

## 2017-07-16 PROCEDURE — 84100 ASSAY OF PHOSPHORUS: CPT

## 2017-07-16 PROCEDURE — 99233 SBSQ HOSP IP/OBS HIGH 50: CPT | Mod: GC,,, | Performed by: INTERNAL MEDICINE

## 2017-07-16 PROCEDURE — 80053 COMPREHEN METABOLIC PANEL: CPT

## 2017-07-16 PROCEDURE — 99233 SBSQ HOSP IP/OBS HIGH 50: CPT | Mod: ,,, | Performed by: INTERNAL MEDICINE

## 2017-07-16 PROCEDURE — 25000003 PHARM REV CODE 250: Performed by: INTERNAL MEDICINE

## 2017-07-16 PROCEDURE — 83735 ASSAY OF MAGNESIUM: CPT

## 2017-07-16 PROCEDURE — 63600175 PHARM REV CODE 636 W HCPCS: Performed by: INTERNAL MEDICINE

## 2017-07-16 PROCEDURE — 93005 ELECTROCARDIOGRAM TRACING: CPT

## 2017-07-16 PROCEDURE — 63600175 PHARM REV CODE 636 W HCPCS: Performed by: STUDENT IN AN ORGANIZED HEALTH CARE EDUCATION/TRAINING PROGRAM

## 2017-07-16 PROCEDURE — 27000190 HC CPAP FULL FACE MASK W/VALVE

## 2017-07-16 PROCEDURE — 99900035 HC TECH TIME PER 15 MIN (STAT)

## 2017-07-16 PROCEDURE — 20600001 HC STEP DOWN PRIVATE ROOM

## 2017-07-16 PROCEDURE — 25000242 PHARM REV CODE 250 ALT 637 W/ HCPCS: Performed by: STUDENT IN AN ORGANIZED HEALTH CARE EDUCATION/TRAINING PROGRAM

## 2017-07-16 PROCEDURE — 27000221 HC OXYGEN, UP TO 24 HOURS

## 2017-07-16 PROCEDURE — 85025 COMPLETE CBC W/AUTO DIFF WBC: CPT

## 2017-07-16 PROCEDURE — 84132 ASSAY OF SERUM POTASSIUM: CPT

## 2017-07-16 PROCEDURE — 94640 AIRWAY INHALATION TREATMENT: CPT

## 2017-07-16 RX ORDER — POTASSIUM CHLORIDE 20 MEQ/1
60 TABLET, EXTENDED RELEASE ORAL ONCE
Status: COMPLETED | OUTPATIENT
Start: 2017-07-16 | End: 2017-07-16

## 2017-07-16 RX ORDER — ALPRAZOLAM 0.25 MG/1
0.25 TABLET ORAL ONCE
Status: COMPLETED | OUTPATIENT
Start: 2017-07-16 | End: 2017-07-16

## 2017-07-16 RX ORDER — FUROSEMIDE 10 MG/ML
80 INJECTION INTRAMUSCULAR; INTRAVENOUS ONCE
Status: COMPLETED | OUTPATIENT
Start: 2017-07-16 | End: 2017-07-16

## 2017-07-16 RX ORDER — POTASSIUM CHLORIDE 20 MEQ/15ML
40 SOLUTION ORAL DAILY
Status: DISCONTINUED | OUTPATIENT
Start: 2017-07-16 | End: 2017-07-18

## 2017-07-16 RX ORDER — ENOXAPARIN SODIUM 100 MG/ML
40 INJECTION SUBCUTANEOUS EVERY 24 HOURS
Status: DISCONTINUED | OUTPATIENT
Start: 2017-07-16 | End: 2017-07-22 | Stop reason: HOSPADM

## 2017-07-16 RX ADMIN — POTASSIUM CHLORIDE 40 MEQ: 20 SOLUTION ORAL at 12:07

## 2017-07-16 RX ADMIN — ALBUTEROL SULFATE 2.5 MG: 2.5 SOLUTION RESPIRATORY (INHALATION) at 08:07

## 2017-07-16 RX ADMIN — POTASSIUM CHLORIDE 60 MEQ: 1500 TABLET, EXTENDED RELEASE ORAL at 06:07

## 2017-07-16 RX ADMIN — FUROSEMIDE 20 MG/HR: 10 INJECTION, SOLUTION INTRAVENOUS at 01:07

## 2017-07-16 RX ADMIN — ENOXAPARIN SODIUM 40 MG: 100 INJECTION SUBCUTANEOUS at 05:07

## 2017-07-16 RX ADMIN — PREDNISONE 20 MG: 20 TABLET ORAL at 10:07

## 2017-07-16 RX ADMIN — ALBUTEROL SULFATE 2.5 MG: 2.5 SOLUTION RESPIRATORY (INHALATION) at 11:07

## 2017-07-16 RX ADMIN — ALBUTEROL SULFATE 2.5 MG: 2.5 SOLUTION RESPIRATORY (INHALATION) at 04:07

## 2017-07-16 RX ADMIN — Medication 3 ML: at 09:07

## 2017-07-16 RX ADMIN — FLUOXETINE 10 MG: 10 CAPSULE ORAL at 11:07

## 2017-07-16 RX ADMIN — ALPRAZOLAM 0.25 MG: 0.25 TABLET ORAL at 02:07

## 2017-07-16 RX ADMIN — POTASSIUM CHLORIDE 40 MEQ: 1500 TABLET, EXTENDED RELEASE ORAL at 10:07

## 2017-07-16 RX ADMIN — Medication 3 ML: at 06:07

## 2017-07-16 RX ADMIN — HEPARIN SODIUM 5000 UNITS: 5000 INJECTION, SOLUTION INTRAVENOUS; SUBCUTANEOUS at 06:07

## 2017-07-16 RX ADMIN — FUROSEMIDE 80 MG: 10 INJECTION, SOLUTION INTRAVENOUS at 11:07

## 2017-07-16 RX ADMIN — ALBUTEROL SULFATE 2.5 MG: 2.5 SOLUTION RESPIRATORY (INHALATION) at 12:07

## 2017-07-16 NOTE — HOSPITAL COURSE
Patient has diuresed overnight but suboptimally.  She is still overloaded on exam 7/16/17 and has been compliant with CPAP overnight.

## 2017-07-16 NOTE — ASSESSMENT & PLAN NOTE
Former smoker quit >8 years ago according to patient. No PFT's in chart to review.   Baseline meds: Advair 250/50, spiriva, albuterol, prednisone.      Baseline home COPD meds (spiriva, breo- substitute for advair, prednisone 20, nebulizers).   Taper to Nasal cannula.

## 2017-07-16 NOTE — ASSESSMENT & PLAN NOTE
Currently suspected to be Group 3 secondary to COPD.      COPD usually not associated with very high pulmonary pressures. When present, pulmonary HTN is usually mild. Hence would encourage entertaining other possible etiologies of PH.   Work up underway per primary team. We will sign off. Please re consult as needed.

## 2017-07-16 NOTE — SUBJECTIVE & OBJECTIVE
Interval History: no acute events overnight    Review of Systems   Constitution: Negative.   HENT: Negative.    Eyes: Negative.    Cardiovascular: Negative.    Respiratory: Negative.    Endocrine: Negative.    Skin: Negative.    Musculoskeletal: Negative.    Gastrointestinal: Negative.    Genitourinary: Negative.    Neurological: Negative.      Objective:     Vital Signs (Most Recent):  Temp: 98.2 °F (36.8 °C) (07/16/17 0715)  Pulse: 106 (07/16/17 0915)  Resp: (!) 27 (07/16/17 0915)  BP: 107/68 (07/16/17 0915)  SpO2: 98 % (07/16/17 0915) Vital Signs (24h Range):  Temp:  [98 °F (36.7 °C)-98.6 °F (37 °C)] 98.2 °F (36.8 °C)  Pulse:  [] 106  Resp:  [17-42] 27  SpO2:  [96 %-100 %] 98 %  BP: ()/(57-81) 107/68     Weight: 70.5 kg (155 lb 6.8 oz)  Body mass index is 30.35 kg/m².     SpO2: 98 %  O2 Device (Oxygen Therapy): BiPAP      Intake/Output Summary (Last 24 hours) at 07/16/17 1022  Last data filed at 07/16/17 0915   Gross per 24 hour   Intake              246 ml   Output              900 ml   Net             -654 ml       Lines/Drains/Airways     Drain            Female External Urinary Catheter 07/14/17 2355 1 day          Peripheral Intravenous Line                 Peripheral IV - Double Lumen 07/14/17 Right Forearm 2 days         Peripheral IV - Single Lumen 07/14/17 2115 Left Forearm 1 day                Physical Exam   Constitutional: She is oriented to person, place, and time. She appears well-developed and well-nourished.   HENT:   Head: Normocephalic and atraumatic.   Eyes: Conjunctivae and EOM are normal. Pupils are equal, round, and reactive to light.   Neck: Normal range of motion. Neck supple.   Difficult to appreciate JVD given BIPAP, size/girth of neck   Cardiovascular: Normal rate, regular rhythm and normal heart sounds.  Exam reveals no gallop and no friction rub.    No murmur heard.  Pulmonary/Chest: Effort normal. No respiratory distress. She has no wheezes. She has rales. She exhibits no  tenderness.   bibasilar   Abdominal: Soft. Bowel sounds are normal. She exhibits no distension. There is no tenderness.   Musculoskeletal: Normal range of motion. She exhibits edema. She exhibits no tenderness.   2+ pitting bilateral le   Neurological: She is alert and oriented to person, place, and time.   Skin: Skin is warm and dry. No erythema. No pallor.       Significant Labs:   CMP   Recent Labs  Lab 07/14/17  2050 07/15/17  0336 07/16/17  0344    143 140   K 3.7 3.6 3.0*   CL 87* 87* 83*   CO2 43* 45* 48*   * 161* 109   BUN 23* 19 15   CREATININE 0.7 0.6 0.5   CALCIUM 8.7 7.9* 8.4*   PROT 5.7* 6.1 5.9*   ALBUMIN 2.9* 3.2* 3.0*   BILITOT 0.5 0.6 0.7   ALKPHOS 55 54* 56   AST 12 15 14   ALT 20 22 20   ANIONGAP 8 11 9   ESTGFRAFRICA >60.0 >60.0 >60.0   EGFRNONAA >60.0 >60.0 >60.0   , CBC   Recent Labs  Lab 07/14/17  2050 07/15/17  0336 07/16/17  0344   WBC 19.19* 15.32* 21.58*   HGB 10.9* 11.3* 11.6*   HCT 37.4 39.8 39.4    187 184    and INR   Recent Labs  Lab 07/14/17 2050   INR 1.2       Significant Imaging: Echocardiogram:   2D echo with color flow doppler:   Results for orders placed or performed during the hospital encounter of 07/14/17   2D echo with color flow doppler   Result Value Ref Range    EF 55 55 - 65    Mitral Valve Regurgitation MILD     Est. PA Systolic Pressure 66.04 (A)     Tricuspid Valve Regurgitation TRIVIAL TO MILD

## 2017-07-16 NOTE — PLAN OF CARE
Problem: Patient Care Overview  Goal: Plan of Care Review  Outcome: Ongoing (interventions implemented as appropriate)  Pt remain free of falls, injury, and complaints throughout the shift. Generalized skin remains CDI with mild generalized edema noted; VSS. Pt diuresing well with lasix gtt. Pt O2 remains >90% on 4L NC. Pt educated on S/S of acute hypoxia and when to press the call button should the pt become symptomatic. Pt denies pain and anxiety at this time. Pt stepped down from [facility]ICU today and oriented to unit. Held orders released and implemented; pt tolerating plan of care.

## 2017-07-16 NOTE — SUBJECTIVE & OBJECTIVE
Subjective:     Interval History: No overnight events. She tolerated BiPAP without issues, this AM transitioned to Nasal cannula.     Objective:     Vital Signs (Most Recent):  Temp: 98.6 °F (37 °C) (07/16/17 1115)  Pulse: (!) 123 (07/16/17 1315)  Resp: (!) 26 (07/16/17 1315)  BP: 133/70 (07/16/17 1315)  SpO2: (!) 93 % (07/16/17 1315) Vital Signs (24h Range):  Temp:  [98.1 °F (36.7 °C)-98.6 °F (37 °C)] 98.6 °F (37 °C)  Pulse:  [] 123  Resp:  [17-42] 26  SpO2:  [93 %-100 %] 93 %  BP: (101-144)/(57-81) 133/70     Weight: 70.5 kg (155 lb 6.8 oz)  Body mass index is 30.35 kg/m².      Intake/Output Summary (Last 24 hours) at 07/16/17 1450  Last data filed at 07/16/17 1215   Gross per 24 hour   Intake            247.8 ml   Output              900 ml   Net           -652.2 ml       Physical Exam    Gen: alert and oriented. On venti mask   HEENT: oral mucosa moist, free of lesions, no dental abnormalities, neck free of lymphadenopathy. JVD negative, no eye abnormalities. Pupils appears equal and reactive.   Chest: No wheezing.   Heart: RRR, No M/G/r.   Abdomen: soft, non tender, no masses. No g/r/r  Extremities: Moderate pitting edema noted bilaterally.        Significant Labs:    CBC/Anemia Profile:    Recent Labs  Lab 07/14/17  2050 07/15/17  0336 07/16/17  0344   WBC 19.19* 15.32* 21.58*   HGB 10.9* 11.3* 11.6*   HCT 37.4 39.8 39.4    187 184   MCV 90 90 89   RDW 16.2* 16.2* 16.1*        Chemistries:    Recent Labs  Lab 07/14/17  2050 07/15/17  0336 07/16/17  0344    143 140   K 3.7 3.6 3.0*   CL 87* 87* 83*   CO2 43* 45* 48*   BUN 23* 19 15   CREATININE 0.7 0.6 0.5   CALCIUM 8.7 7.9* 8.4*   ALBUMIN 2.9* 3.2* 3.0*   PROT 5.7* 6.1 5.9*   BILITOT 0.5 0.6 0.7   ALKPHOS 55 54* 56   ALT 20 22 20   AST 12 15 14   MG 2.2 2.2 2.2   PHOS 3.6 3.6 2.8

## 2017-07-16 NOTE — PROGRESS NOTES
[facility]  Pulmonology  Progress Note    Patient Name: Joy Black  MRN: [id number]  Admission Date: 7/14/2017  Hospital Length of Stay: 2 days  Code Status: Full Code  Attending Provider: Erihc Medley Jr.,*  Primary Care Provider: Jim Hernandez MD   Principal Problem: <principal problem not specified>      Subjective:     Interval History: No overnight events. She tolerated BiPAP without issues, this AM transitioned to Nasal cannula.     Objective:     Vital Signs (Most Recent):  Temp: 98.6 °F (37 °C) (07/16/17 1115)  Pulse: (!) 123 (07/16/17 1315)  Resp: (!) 26 (07/16/17 1315)  BP: 133/70 (07/16/17 1315)  SpO2: (!) 93 % (07/16/17 1315) Vital Signs (24h Range):  Temp:  [98.1 °F (36.7 °C)-98.6 °F (37 °C)] 98.6 °F (37 °C)  Pulse:  [] 123  Resp:  [17-42] 26  SpO2:  [93 %-100 %] 93 %  BP: (101-144)/(57-81) 133/70     Weight: 70.5 kg (155 lb 6.8 oz)  Body mass index is 30.35 kg/m².      Intake/Output Summary (Last 24 hours) at 07/16/17 1450  Last data filed at 07/16/17 1215   Gross per 24 hour   Intake            247.8 ml   Output              900 ml   Net           -652.2 ml       Physical Exam    Gen: alert and oriented. On venti mask   HEENT: oral mucosa moist, free of lesions, no dental abnormalities, neck free of lymphadenopathy. JVD negative, no eye abnormalities. Pupils appears equal and reactive.   Chest: No wheezing.   Heart: RRR, No M/G/r.   Abdomen: soft, non tender, no masses. No g/r/r  Extremities: Moderate pitting edema noted bilaterally.        Significant Labs:    CBC/Anemia Profile:    Recent Labs  Lab 07/14/17  2050 07/15/17  0336 07/16/17  0344   WBC 19.19* 15.32* 21.58*   HGB 10.9* 11.3* 11.6*   HCT 37.4 39.8 39.4    187 184   MCV 90 90 89   RDW 16.2* 16.2* 16.1*        Chemistries:    Recent Labs  Lab 07/14/17  2050 07/15/17  0336 07/16/17  0344    143 140   K 3.7 3.6 3.0*   CL 87* 87* 83*   CO2 43* 45* 48*   BUN 23* 19 15   CREATININE 0.7 0.6 0.5    CALCIUM 8.7 7.9* 8.4*   ALBUMIN 2.9* 3.2* 3.0*   PROT 5.7* 6.1 5.9*   BILITOT 0.5 0.6 0.7   ALKPHOS 55 54* 56   ALT 20 22 20   AST 12 15 14   MG 2.2 2.2 2.2   PHOS 3.6 3.6 2.8           Assessment/Plan:     Centrilobular emphysema    Former smoker quit >8 years ago according to patient. No PFT's in chart to review.   Baseline meds: Advair 250/50, spiriva, albuterol, prednisone.      Baseline home COPD meds (spiriva, breo- substitute for advair, prednisone 20, nebulizers).   Taper to Nasal cannula.         Pulmonary hypertension    Currently suspected to be Group 3 secondary to COPD.      COPD usually not associated with very high pulmonary pressures. When present, pulmonary HTN is usually mild. Hence would encourage entertaining other possible etiologies of PH.   Work up underway per primary team. We will sign off. Please re consult as needed.                Fay Garibay,   Pulmonology  [facility]

## 2017-07-16 NOTE — PLAN OF CARE
Problem: Patient Care Overview  Goal: Plan of Care Review  Outcome: Ongoing (interventions implemented as appropriate)  Plan of care reviewed with patient

## 2017-07-16 NOTE — NURSING
Report called to Patricia Ocampo for room 321 where patient is being transferred to via wheelchair on new tele box. She is going to notify  of her transfer.

## 2017-07-16 NOTE — ASSESSMENT & PLAN NOTE
- WHO class still pending as there are mutliple discrepancies in OSH testing and ours.  Repeat RHC when euvolemic to assess PA pressures  - V/Q yet to be done  - PASpressures on echo 66  - nebs ATC/PRN  - c/w prednisone  - Pulmonary/ID doubt pneumonia, abx stopped -- consults appreciated  - repeat CT chest after appropriate diuresis to eval for ILD  - increase IV diuresis with lasix 80 mg IVP then drip to 20 mg/hr  - 2 gm NA/1500 mL fluid restric/ I/Os / Dw  - PH work- up  - PFTs pending/ AI connective tissue dz antibodies pending / VQ scan pending  - c/w ICS/LABA

## 2017-07-16 NOTE — PROGRESS NOTES
Pt from 3094 via wheelchair on 4L NC satting >90% with lasix gtt infusing at 4mL/hr. Pt oriented to room and unit; assessment and vitals performed and charted in Epic. No complaints of pain or discomfort at this time. Pt resting and sitting upright in chair. Will continue to monitor.

## 2017-07-16 NOTE — PROGRESS NOTES
[facility]  Cardiology  Progress Note    Patient Name: Joy Blcak  MRN: [id number]  Admission Date: 7/14/2017  Hospital Length of Stay: 2 days  Code Status: Full Code   Attending Physician: Erich Medley Jr.,*   Primary Care Physician: Jim Hernandez MD  Expected Discharge Date:   Principal Problem:<principal problem not specified>    Subjective:     Hospital Course:   Patient has diuresed overnight but suboptimally.  She is still overloaded on exam 7/16/17 and has been compliant with CPAP overnight.      Interval History: no acute events overnight    Review of Systems   Constitution: Negative.   HENT: Negative.    Eyes: Negative.    Cardiovascular: Negative.    Respiratory: Negative.    Endocrine: Negative.    Skin: Negative.    Musculoskeletal: Negative.    Gastrointestinal: Negative.    Genitourinary: Negative.    Neurological: Negative.      Objective:     Vital Signs (Most Recent):  Temp: 98.2 °F (36.8 °C) (07/16/17 0715)  Pulse: 106 (07/16/17 0915)  Resp: (!) 27 (07/16/17 0915)  BP: 107/68 (07/16/17 0915)  SpO2: 98 % (07/16/17 0915) Vital Signs (24h Range):  Temp:  [98 °F (36.7 °C)-98.6 °F (37 °C)] 98.2 °F (36.8 °C)  Pulse:  [] 106  Resp:  [17-42] 27  SpO2:  [96 %-100 %] 98 %  BP: ()/(57-81) 107/68     Weight: 70.5 kg (155 lb 6.8 oz)  Body mass index is 30.35 kg/m².     SpO2: 98 %  O2 Device (Oxygen Therapy): BiPAP      Intake/Output Summary (Last 24 hours) at 07/16/17 1022  Last data filed at 07/16/17 0915   Gross per 24 hour   Intake              246 ml   Output              900 ml   Net             -654 ml       Lines/Drains/Airways     Drain            Female External Urinary Catheter 07/14/17 2355 1 day          Peripheral Intravenous Line                 Peripheral IV - Double Lumen 07/14/17 Right Forearm 2 days         Peripheral IV - Single Lumen 07/14/17 2115 Left Forearm 1 day                Physical Exam   Constitutional: She is oriented to person, place, and  time. She appears well-developed and well-nourished.   HENT:   Head: Normocephalic and atraumatic.   Eyes: Conjunctivae and EOM are normal. Pupils are equal, round, and reactive to light.   Neck: Normal range of motion. Neck supple.   Difficult to appreciate JVD given BIPAP, size/girth of neck   Cardiovascular: Normal rate, regular rhythm and normal heart sounds.  Exam reveals no gallop and no friction rub.    No murmur heard.  Pulmonary/Chest: Effort normal. No respiratory distress. She has no wheezes. She has rales. She exhibits no tenderness.   bibasilar   Abdominal: Soft. Bowel sounds are normal. She exhibits no distension. There is no tenderness.   Musculoskeletal: Normal range of motion. She exhibits edema. She exhibits no tenderness.   2+ pitting bilateral le   Neurological: She is alert and oriented to person, place, and time.   Skin: Skin is warm and dry. No erythema. No pallor.       Significant Labs:   CMP   Recent Labs  Lab 07/14/17  2050 07/15/17  0336 07/16/17  0344    143 140   K 3.7 3.6 3.0*   CL 87* 87* 83*   CO2 43* 45* 48*   * 161* 109   BUN 23* 19 15   CREATININE 0.7 0.6 0.5   CALCIUM 8.7 7.9* 8.4*   PROT 5.7* 6.1 5.9*   ALBUMIN 2.9* 3.2* 3.0*   BILITOT 0.5 0.6 0.7   ALKPHOS 55 54* 56   AST 12 15 14   ALT 20 22 20   ANIONGAP 8 11 9   ESTGFRAFRICA >60.0 >60.0 >60.0   EGFRNONAA >60.0 >60.0 >60.0   , CBC   Recent Labs  Lab 07/14/17  2050 07/15/17  0336 07/16/17  0344   WBC 19.19* 15.32* 21.58*   HGB 10.9* 11.3* 11.6*   HCT 37.4 39.8 39.4    187 184    and INR   Recent Labs  Lab 07/14/17 2050   INR 1.2       Significant Imaging: Echocardiogram:   2D echo with color flow doppler:   Results for orders placed or performed during the hospital encounter of 07/14/17   2D echo with color flow doppler   Result Value Ref Range    EF 55 55 - 65    Mitral Valve Regurgitation MILD     Est. PA Systolic Pressure 66.04 (A)     Tricuspid Valve Regurgitation TRIVIAL TO MILD      Assessment and  Plan:     Brief HPI: 62 y/o Female with h/o emphysema (on 3-3.5L NC), DM, former smoker (quit 15 years ago), who is transferred here for evaluation of PHTN/right heart failure.     Hypoxia    On O2 nc to maintain sat>90  CPAP at night        Centrilobular emphysema    Continue dulce/albuterol/laba/ics        Pulmonary hypertension    - WHO class still pending as there are mutliple discrepancies in OSH testing and ours.  Repeat RHC when euvolemic to assess PA pressures  - V/Q yet to be done  - PASpressures on echo 66  - nebs ATC/PRN  - c/w prednisone  - Pulmonary/ID doubt pneumonia, abx stopped -- consults appreciated  - repeat CT chest after appropriate diuresis to eval for ILD  - increase IV diuresis with lasix 80 mg IVP then drip to 20 mg/hr  - 2 gm NA/1500 mL fluid restric/ I/Os / Dw  - PH work- up  - PFTs pending/ AI connective tissue dz antibodies pending / VQ scan pending  - c/w ICS/LABA          Hypokalemia:  repleted PO, recheck in AM    VTE Risk Mitigation         Ordered     enoxaparin injection 40 mg  Daily     Route:  Subcutaneous        07/16/17 0629     Medium Risk of VTE  Once      07/14/17 2052          Jose Antonio Teixeira MD  Cardiology  [facility]

## 2017-07-16 NOTE — HPI
61 y.o. woman with PMH of COPD on home O2 (12 years), DM, remote cervical Ca, HTN, smoker (1-2 PPD x 32 years) who is a transfer from [facility] for evaluation of severe PH with right heart failure.     The patient was admitted to the OSH on 7/5/17 for having low O2 sats (49 - 60% at home), SOB for 2 weeks PTP, progressive leg swelling, and cough productive of bloody sputum. She had been suffering from sinusitis/viral URTI symptoms prior to this. Her  reports asking her to go to the hospital earlier however she received due to scare of being intubated and being put on a ventilator. While in the hospital she was found to diagnosed with COPD exacerbation and had courses of IV solumedrol. She was also found to have RLL PNA and was treated with BS ABx (vanc/zoysn/levofloxacin/flagyl) as well as IV fluids. During this time she was also found to have RHF and was treated with IV lasix. She had been put on BiPAP at the OSH as well. Her TTE there revealed LVEF >66%, G1DD, LVEDD 3.8 cm, RV systolic dysf (TAPSE 1.1, RVS' 7), mild-mod MR, mod-severe TR, PASP 81, RAP 15. Her RHC on 7/11/17 revealed RA 18, /40, /38 (mean 71), PWP 12, CO 7.65, CI 4.27. 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 She was subsequently transferred to [facility] for further evaluation.     Upon arrival to [facility], she was tachycardic (110s; sinus), RR 27, /78, 100% on 10 L venti mask. She was in no acute distress. Diffusely volume overloaded on exam. Lung exam revealed decreased air entry w/o active wheeze/rhonchi. WBC 19.19 (while on steroids and lower than 21.7 at OSH), Hb 10.9, INR 1.2, K 3.7, CO2 43, Cr 0.7, AST 12, ALT 20, T bili 0.5, MG 2.2, , Tn 0.020.      She reports in addition to chronic smoking, she was also exposed to  industrial chemicals 12 years ago in [place], [place] when the town had been evacuated. After this, her respiratory symptoms had surfaced. She reports compliance with all her inhalers. Reports quitting smoking 15 years ago. Denies ETOH or drug use. Also her and her  used to have 140 birds as pets, including parrots. In addition prior to rapid worsening of her symptoms 2 weeks PTP to the OSH, she had been noticing increased O2 requirements from 2LNC up to 5LNC in the last 6 months.

## 2017-07-17 PROBLEM — J96.20 ACUTE ON CHRONIC RESPIRATORY FAILURE: Status: ACTIVE | Noted: 2017-07-17

## 2017-07-17 PROBLEM — I27.81 COR PULMONALE: Status: ACTIVE | Noted: 2017-07-17

## 2017-07-17 LAB
ALBUMIN SERPL BCP-MCNC: 2.9 G/DL
ALLENS TEST: ABNORMAL
ALP SERPL-CCNC: 67 U/L
ALT SERPL W/O P-5'-P-CCNC: 19 U/L
ANA SER QL IF: NORMAL
ANION GAP SERPL CALC-SCNC: 13 MMOL/L
AST SERPL-CCNC: 15 U/L
BASOPHILS # BLD AUTO: 0.01 K/UL
BASOPHILS NFR BLD: 0 %
BILIRUB SERPL-MCNC: 0.7 MG/DL
BUN SERPL-MCNC: 17 MG/DL
CALCIUM SERPL-MCNC: 9.6 MG/DL
CHLORIDE SERPL-SCNC: 83 MMOL/L
CO2 SERPL-SCNC: 44 MMOL/L
CREAT SERPL-MCNC: 0.6 MG/DL
DELSYS: ABNORMAL
DIFFERENTIAL METHOD: ABNORMAL
DSDNA AB SER-ACNC: NORMAL [IU]/ML
EOSINOPHIL # BLD AUTO: 0.1 K/UL
EOSINOPHIL NFR BLD: 0.2 %
EP: 5
ERYTHROCYTE [DISTWIDTH] IN BLOOD BY AUTOMATED COUNT: 16.3 %
ERYTHROCYTE [SEDIMENTATION RATE] IN BLOOD BY WESTERGREN METHOD: 14 MM/H
EST. GFR  (AFRICAN AMERICAN): >60 ML/MIN/1.73 M^2
EST. GFR  (NON AFRICAN AMERICAN): >60 ML/MIN/1.73 M^2
FIO2: 50
GLUCOSE SERPL-MCNC: 97 MG/DL
HCO3 UR-SCNC: 55.5 MMOL/L (ref 24–28)
HCT VFR BLD AUTO: 40.8 %
HGB BLD-MCNC: 11.9 G/DL
HISTONE IGG SER IA-ACNC: 0.2 UNITS (ref 0–0.9)
IP: 10
LYMPHOCYTES # BLD AUTO: 0.7 K/UL
LYMPHOCYTES NFR BLD: 3.2 %
MAGNESIUM SERPL-MCNC: 2.2 MG/DL
MCH RBC QN AUTO: 25.9 PG
MCHC RBC AUTO-ENTMCNC: 29.2 %
MCV RBC AUTO: 89 FL
MODE: ABNORMAL
MONOCYTES # BLD AUTO: 1 K/UL
MONOCYTES NFR BLD: 4.8 %
NEUTROPHILS # BLD AUTO: 19.3 K/UL
NEUTROPHILS NFR BLD: 91.2 %
PCO2 BLDA: 75.7 MMHG (ref 35–45)
PH SMN: 7.47 [PH] (ref 7.35–7.45)
PHOSPHATE SERPL-MCNC: 3.2 MG/DL
PLATELET # BLD AUTO: 183 K/UL
PMV BLD AUTO: 10.8 FL
PO2 BLDA: 87 MMHG (ref 80–100)
POC BE: >30 MMOL/L
POC SATURATED O2: 97 % (ref 95–100)
POC TCO2: >50 MMOL/L (ref 23–27)
POTASSIUM SERPL-SCNC: 3.6 MMOL/L
PROT SERPL-MCNC: 6.4 G/DL
RBC # BLD AUTO: 4.59 M/UL
SAMPLE: ABNORMAL
SITE: ABNORMAL
SODIUM SERPL-SCNC: 140 MMOL/L
SP02: 100
SPONT RATE: 16
WBC # BLD AUTO: 21.14 K/UL

## 2017-07-17 PROCEDURE — 94660 CPAP INITIATION&MGMT: CPT

## 2017-07-17 PROCEDURE — 83735 ASSAY OF MAGNESIUM: CPT

## 2017-07-17 PROCEDURE — 94760 N-INVAS EAR/PLS OXIMETRY 1: CPT

## 2017-07-17 PROCEDURE — 25000003 PHARM REV CODE 250: Performed by: STUDENT IN AN ORGANIZED HEALTH CARE EDUCATION/TRAINING PROGRAM

## 2017-07-17 PROCEDURE — 27100171 HC OXYGEN HIGH FLOW UP TO 24 HOURS

## 2017-07-17 PROCEDURE — 63600175 PHARM REV CODE 636 W HCPCS: Performed by: INTERNAL MEDICINE

## 2017-07-17 PROCEDURE — 99900035 HC TECH TIME PER 15 MIN (STAT)

## 2017-07-17 PROCEDURE — 80053 COMPREHEN METABOLIC PANEL: CPT

## 2017-07-17 PROCEDURE — 63600175 PHARM REV CODE 636 W HCPCS: Performed by: STUDENT IN AN ORGANIZED HEALTH CARE EDUCATION/TRAINING PROGRAM

## 2017-07-17 PROCEDURE — 94640 AIRWAY INHALATION TREATMENT: CPT

## 2017-07-17 PROCEDURE — 36415 COLL VENOUS BLD VENIPUNCTURE: CPT

## 2017-07-17 PROCEDURE — 85025 COMPLETE CBC W/AUTO DIFF WBC: CPT

## 2017-07-17 PROCEDURE — 27000221 HC OXYGEN, UP TO 24 HOURS

## 2017-07-17 PROCEDURE — 94761 N-INVAS EAR/PLS OXIMETRY MLT: CPT

## 2017-07-17 PROCEDURE — 20600001 HC STEP DOWN PRIVATE ROOM

## 2017-07-17 PROCEDURE — 25000003 PHARM REV CODE 250: Performed by: INTERNAL MEDICINE

## 2017-07-17 PROCEDURE — 25000242 PHARM REV CODE 250 ALT 637 W/ HCPCS: Performed by: STUDENT IN AN ORGANIZED HEALTH CARE EDUCATION/TRAINING PROGRAM

## 2017-07-17 PROCEDURE — 84100 ASSAY OF PHOSPHORUS: CPT

## 2017-07-17 PROCEDURE — 99232 SBSQ HOSP IP/OBS MODERATE 35: CPT | Mod: ,,, | Performed by: INTERNAL MEDICINE

## 2017-07-17 RX ORDER — FUROSEMIDE 10 MG/ML
80 INJECTION INTRAMUSCULAR; INTRAVENOUS 2 TIMES DAILY
Status: DISCONTINUED | OUTPATIENT
Start: 2017-07-17 | End: 2017-07-20

## 2017-07-17 RX ADMIN — ALBUTEROL SULFATE 2.5 MG: 2.5 SOLUTION RESPIRATORY (INHALATION) at 05:07

## 2017-07-17 RX ADMIN — ALBUTEROL SULFATE 2.5 MG: 2.5 SOLUTION RESPIRATORY (INHALATION) at 11:07

## 2017-07-17 RX ADMIN — FLUTICASONE FUROATE AND VILANTEROL TRIFENATATE 1 PUFF: 100; 25 POWDER RESPIRATORY (INHALATION) at 08:07

## 2017-07-17 RX ADMIN — POTASSIUM CHLORIDE 40 MEQ: 1500 TABLET, EXTENDED RELEASE ORAL at 08:07

## 2017-07-17 RX ADMIN — FLUOXETINE 10 MG: 10 CAPSULE ORAL at 09:07

## 2017-07-17 RX ADMIN — ALBUTEROL SULFATE 2.5 MG: 2.5 SOLUTION RESPIRATORY (INHALATION) at 03:07

## 2017-07-17 RX ADMIN — ALBUTEROL SULFATE 2.5 MG: 2.5 SOLUTION RESPIRATORY (INHALATION) at 08:07

## 2017-07-17 RX ADMIN — Medication 3 ML: at 06:07

## 2017-07-17 RX ADMIN — PREDNISONE 20 MG: 20 TABLET ORAL at 08:07

## 2017-07-17 RX ADMIN — ENOXAPARIN SODIUM 40 MG: 100 INJECTION SUBCUTANEOUS at 03:07

## 2017-07-17 RX ADMIN — Medication 3 ML: at 08:07

## 2017-07-17 RX ADMIN — TIOTROPIUM BROMIDE 18 MCG: 18 CAPSULE ORAL; RESPIRATORY (INHALATION) at 08:07

## 2017-07-17 RX ADMIN — POTASSIUM CHLORIDE 40 MEQ: 20 SOLUTION ORAL at 08:07

## 2017-07-17 RX ADMIN — FUROSEMIDE 80 MG: 10 INJECTION, SOLUTION INTRAVENOUS at 03:07

## 2017-07-17 NOTE — PROGRESS NOTES
Admit Note     Met with patient and spouse to assess needs. Patient is a 61 y.o.  female, admitted for for pulmonary hypertension.      Patient admitted from an outside hospital on 7/14/2017.  At this time, patient presents as alert and oriented x 4 and pleasant, pt on bi-pap at this time.  At this time, patients caregiver presents as alert and oriented x 4, pleasant and tangential, requiring frequent redirection to stay focused.    Household/Family Systems     Patient resides with patient's spouse, at:     [address] [place] [address].     breonna Mejia , C: [phone]  Hm: [phone]     Support system includes pt's .  Patient does not have dependents that are need of being cared for. Pt has no biologcial children, pt has one stepson, per 's report he is an alcoholic.     Patients primary caregiver is dmitriy Mejia , phone number [phone].  Confirmed patients contact information is [phone] (home);   No relevant phone numbers on file.       During admission, patient's caregiver plans to stay in patient's room.  Confirmed patient and patients caregivers do have access to reliable transportation.    Cognitive Status/Learning     Patient reports reading ability as 12th grade and states patient does not have difficulty with N/A.  Patient reports patient learns best by one one one and hands on.   Needed: No.   Highest education level: High School (9-12) or GED    Vocation/Disability     Working for Income: No  If no, reason not working: Disability    Patient is disabled due to COPD/Pulmonary HTN.    Adherence     Patient reports a high level of adherence to patients health care regimen.  Adherence counseling and education provided. Patient verbalizes understanding.    Substance Use    Patient reports the following substance usage.    Tobacco: Pt states she quit 15yrs ago and smoked 1PPD prior.  Alcohol: none, patient denies any use.  Illicit  Drugs/Non-prescribed Medications: none, patient denies any use.  Patient states clear understanding of the potential impact of substance use.  Substance abstinence/cessation counseling, education and resources provided and reviewed.     Services Utilizing/ADLS    Infusion Service: Prior to admission, patient utilizing? no  Home Health: Prior to admission, patient utilizing? no  DME: Prior to admission, oxygen, w/c and walker  Pulmonary/Cardiac Rehab: Prior to admission, no  Dialysis:  Prior to admission, no  Transplant Specialty Pharmacy:  Prior to admission, no.    Prior to admission, patient reports patient was independent with ADLS and was not driving.  Patient reports patient is not able to care for self at this time due to compromised medical condition (as documented in medical record) and physical weakness..  Patient indicates a willingness to care for self once medically cleared to do so.    Insurance/Medications    Insured by   Payor/Plan Subscr  Sex Relation Sub. Ins. ID Effective Group Num   1. [employer] MANAGE* LONNIE PATINO 1955 Female  [id number]                                   [address] [address]   2. [facility]* OMEGA PATINO 1951 Male  [id number]                                   [address]      Primary Insurance (for UNOS reporting): Public Insurance - Medicare FFS (Fee For Service)  Secondary Insurance (for UNOS reporting): Private Insurance    Patient reports patient is able to obtain and afford medications at this time and at time of discharge.    Living Will/Healthcare Power of     Patient states patient does not have a LW and/or HCPA.   provided education regarding LW and HCPA and the completion of forms.    Coping/Mental Health    Patient is coping adequately with the aid of  family members.   Patient indicates mental health difficulties, pt has had some depression. Pt takes Prozac and has Xanax for PRN use but rarely  uses.    Discharge Planning    At time of discharge, patient plans to return to patient's home under the care of Roberto.  Patients  will transport patient.  Per rounds today, expected discharge date has not been medically determined at this time. Patient and patients caregiver  verbalize understanding and are involved in treatment planning and discharge process.    Additional Concerns    Patient's caretaker denies additional needs and/or concerns at this time.  providing ongoing psychosocial support, education, resources and d/c planning as needed.  SW remains available.  remains available.

## 2017-07-17 NOTE — SUBJECTIVE & OBJECTIVE
Interval History: NAEON. Feels well and denies any major complaints.    Continuous Infusions:   Scheduled Meds:   albuterol sulfate  2.5 mg Nebulization Q4H    enoxaparin  40 mg Subcutaneous Daily    fluoxetine  10 mg Oral Daily    fluticasone-vilanterol  1 puff Inhalation Daily    furosemide  80 mg Intravenous BID    potassium chloride 10%  40 mEq Oral Daily    potassium chloride  40 mEq Oral Daily    predniSONE  20 mg Oral Daily    sodium chloride 0.9%  3 mL Intravenous Q8H    tiotropium  1 capsule Inhalation Daily     PRN Meds:    Review of patient's allergies indicates:  No Known Allergies  Objective:     Vital Signs (Most Recent):  Temp: 98.7 °F (37.1 °C) (07/17/17 1200)  Pulse: (!) 112 (07/17/17 1446)  Resp: 18 (07/17/17 1200)  BP: 124/68 (07/17/17 1200)  SpO2: (!) 93 % (07/17/17 1200) Vital Signs (24h Range):  Temp:  [97.8 °F (36.6 °C)-99.1 °F (37.3 °C)] 98.7 °F (37.1 °C)  Pulse:  [] 112  Resp:  [16-33] 18  SpO2:  [88 %-100 %] 93 %  BP: (103-135)/(64-76) 124/68     Weight: 66.9 kg (147 lb 7.8 oz)  Body mass index is 28.8 kg/m².      Intake/Output Summary (Last 24 hours) at 07/17/17 1453  Last data filed at 07/17/17 1400   Gross per 24 hour   Intake              605 ml   Output              450 ml   Net              155 ml       Hemodynamic Parameters:         Physical Exam:  Constitutional: NAD, conversant  HEENT: Sclera anicteric, PERRLA, EOMI  Neck: Unable to visualize JVD, no carotid bruits  CV: Regular rhythm, Tachycardic, no murmur, S1/S2 with loud P2 component, No Pericardial rub  Pulm: decreased air entry  GI: Abdomen soft, NTND, +BS  Extremities: 2+ LE edema, warm and well perfused, No cyanosis, No clubbing  Skin: No ecchymosis, erythema, or ulcers  Psych: AOx3, appropriate affect  Neuro: CNII-XII intact, no focal deficits        Significant Labs:  CBC:    Recent Labs  Lab 07/17/17  0459   WBC 21.14*   RBC 4.59   HGB 11.9*   HCT 40.8      MCV 89   MCH 25.9*   MCHC 29.2*      BNP:    Recent Labs  Lab 07/14/17 2050   *     CMP:    Recent Labs  Lab 07/17/17  0459   GLU 97   CALCIUM 9.6   ALBUMIN 2.9*   PROT 6.4      K 3.6   CO2 44*   CL 83*   BUN 17   CREATININE 0.6   ALKPHOS 67   ALT 19   AST 15   BILITOT 0.7      Coagulation:     Recent Labs  Lab 07/14/17 2050   INR 1.2     LDH:  No results for input(s): LDH in the last 72 hours.  Microbiology:  Microbiology Results (last 7 days)     Procedure Component Value Units Date/Time    Blood culture [[id number]] Collected:  07/15/17 0330    Order Status:  Completed Specimen:  Blood from Peripheral, Antecubital, Right Updated:  07/17/17 0812     Blood Culture, Routine No Growth to date     Blood Culture, Routine No Growth to date     Blood Culture, Routine No Growth to date    Blood culture [[id number]] Collected:  07/15/17 0345    Order Status:  Completed Specimen:  Blood from Peripheral, Antecubital, Left Updated:  07/17/17 0812     Blood Culture, Routine No Growth to date     Blood Culture, Routine No Growth to date     Blood Culture, Routine No Growth to date          I have reviewed all pertinent labs within the past 24 hours.    Estimated Creatinine Clearance: 86.2 mL/min (based on Cr of 0.6).    Diagnostic Results:  I have reviewed and interpreted all pertinent imaging results/findings within the past 24 hours.

## 2017-07-17 NOTE — PROGRESS NOTES
Pt states she can't catch her breath. 's; SpO2: 87 4L NC. Notified MD Wil. Orders to put pt on HFNC @ 8-12 L placed. Standing weaning orders in place for when pt can tolerate less supplmental oxygen to meet desired requirements. Will continue to monitor.

## 2017-07-17 NOTE — PROGRESS NOTES
[facility]  Heart Transplant  Progress Note    Patient Name: Joy Black  MRN: [id number]  Admission Date: 7/14/2017  Hospital Length of Stay: 3 days  Attending Physician: Erich Medley Jr.,*  Primary Care Provider: Jim Hernandez MD  Principal Problem:Pulmonary hypertension    Subjective:     Interval History: NAEON. Feels well and denies any major complaints.    Continuous Infusions:   Scheduled Meds:   albuterol sulfate  2.5 mg Nebulization Q4H    enoxaparin  40 mg Subcutaneous Daily    fluoxetine  10 mg Oral Daily    fluticasone-vilanterol  1 puff Inhalation Daily    furosemide  80 mg Intravenous BID    potassium chloride 10%  40 mEq Oral Daily    potassium chloride  40 mEq Oral Daily    predniSONE  20 mg Oral Daily    sodium chloride 0.9%  3 mL Intravenous Q8H    tiotropium  1 capsule Inhalation Daily     PRN Meds:    Review of patient's allergies indicates:  No Known Allergies  Objective:     Vital Signs (Most Recent):  Temp: 98.7 °F (37.1 °C) (07/17/17 1200)  Pulse: (!) 112 (07/17/17 1446)  Resp: 18 (07/17/17 1200)  BP: 124/68 (07/17/17 1200)  SpO2: (!) 93 % (07/17/17 1200) Vital Signs (24h Range):  Temp:  [97.8 °F (36.6 °C)-99.1 °F (37.3 °C)] 98.7 °F (37.1 °C)  Pulse:  [] 112  Resp:  [16-33] 18  SpO2:  [88 %-100 %] 93 %  BP: (103-135)/(64-76) 124/68     Weight: 66.9 kg (147 lb 7.8 oz)  Body mass index is 28.8 kg/m².      Intake/Output Summary (Last 24 hours) at 07/17/17 1453  Last data filed at 07/17/17 1400   Gross per 24 hour   Intake              605 ml   Output              450 ml   Net              155 ml       Hemodynamic Parameters:         Physical Exam:  Constitutional: NAD, conversant  HEENT: Sclera anicteric, PERRLA, EOMI  Neck: Unable to visualize JVD, no carotid bruits  CV: Regular rhythm, Tachycardic, no murmur, S1/S2 with loud P2 component, No Pericardial rub  Pulm: decreased air entry  GI: Abdomen soft, NTND, +BS  Extremities: 2+ LE edema, warm and  well perfused, No cyanosis, No clubbing  Skin: No ecchymosis, erythema, or ulcers  Psych: AOx3, appropriate affect  Neuro: CNII-XII intact, no focal deficits        Significant Labs:  CBC:    Recent Labs  Lab 07/17/17 0459   WBC 21.14*   RBC 4.59   HGB 11.9*   HCT 40.8      MCV 89   MCH 25.9*   MCHC 29.2*     BNP:    Recent Labs  Lab 07/14/17 2050   *     CMP:    Recent Labs  Lab 07/17/17 0459   GLU 97   CALCIUM 9.6   ALBUMIN 2.9*   PROT 6.4      K 3.6   CO2 44*   CL 83*   BUN 17   CREATININE 0.6   ALKPHOS 67   ALT 19   AST 15   BILITOT 0.7      Coagulation:     Recent Labs  Lab 07/14/17 2050   INR 1.2     LDH:  No results for input(s): LDH in the last 72 hours.  Microbiology:  Microbiology Results (last 7 days)     Procedure Component Value Units Date/Time    Blood culture [[id number]] Collected:  07/15/17 0330    Order Status:  Completed Specimen:  Blood from Peripheral, Antecubital, Right Updated:  07/17/17 0812     Blood Culture, Routine No Growth to date     Blood Culture, Routine No Growth to date     Blood Culture, Routine No Growth to date    Blood culture [[id number]] Collected:  07/15/17 0345    Order Status:  Completed Specimen:  Blood from Peripheral, Antecubital, Left Updated:  07/17/17 0812     Blood Culture, Routine No Growth to date     Blood Culture, Routine No Growth to date     Blood Culture, Routine No Growth to date          I have reviewed all pertinent labs within the past 24 hours.    Estimated Creatinine Clearance: 86.2 mL/min (based on Cr of 0.6).    Diagnostic Results:  I have reviewed and interpreted all pertinent imaging results/findings within the past 24 hours.    Assessment and Plan:     * Pulmonary hypertension    - WHO class pending. Likely III.   - RHC tomorrow to assess PA pressures  - V/Q low probability for PE  - PASP 66, RAP 3 (TTE 7/15/17)  - nebs ATC/PRN  - c/w prednisone  - Pulmonary/ID doubt pneumonia, abx stopped -- consults appreciated  - repeat CT  chest after appropriate diuresis to eval for ILD  - d/c lasix gtt, change to lasix 80 mg IV BID  - 2 gm NA/1500 mL fluid restric/ I/Os / Dw  - PFTs needed  - MORE, anti-dsDNA pending  - Anti SSA, SSB, AHA, CCP, Rheum factor negative, C3 75, C4 9 (low)  - c/w ICS/LABA        Cor pulmonale    - d/c lasix gtt  - change to lasix 80 mg IV BID  - I/Os, daily weights  - O2 PRN  - PT/OT        Acute on chronic respiratory failure    - O2 PRN  - due to RHF/COPD        Centrilobular emphysema    - c/w nebs  - c/w prednisone  - c/w albuterol/tiotropium/LABA/ICS  - pulm input appreciated          Case discussed with attending. Plan for RHC tomorrow to assess filling pressures, PA Pressure, CO/CI.    Florina De La Torre MD  Heart Transplant  [facility]

## 2017-07-17 NOTE — PROGRESS NOTES
Pt to nuclear medicine via wheelchair on 3L NC and lasix gtt infusing at 4 mL/hr. Awaiting return     1030: Pt return. Will continue to monitor.

## 2017-07-17 NOTE — HPI
61 y.o. woman with PMH of COPD on home O2 (12 years), DM, remote cervical Ca, HTN, smoker (1-2 PPD x 32 years) who is a transfer from [facility] for evaluation of severe PH with right heart failure.     The patient was admitted to the OSH on 7/5/17 for having low O2 sats (49 - 60% at home), SOB for 2 weeks PTP, progressive leg swelling, and cough productive of bloody sputum. She had been suffering from sinusitis/viral URTI symptoms prior to this. Her  reports asking her to go to the hospital earlier however she received due to scare of being intubated and being put on a ventilator. While in the hospital she was found to diagnosed with COPD exacerbation and had courses of IV solumedrol. She was also found to have RLL PNA and was treated with BS ABx (vanc/zoysn/levofloxacin/flagyl) as well as IV fluids. During this time she was also found to have RHF and was treated with IV lasix. She had been put on BiPAP at the OSH as well. Her TTE there revealed LVEF >66%, G1DD, LVEDD 3.8 cm, RV systolic dysf (TAPSE 1.1, RVS' 7), mild-mod MR, mod-severe TR, PASP 81, RAP 15. Her RHC on 7/11/17 revealed RA 18, /40, /38 (mean 71), PWP 12, CO 7.65, CI 4.27. 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 She was subsequently transferred to [facility] for further evaluation.     Upon arrival to [facility], she was tachycardic (110s; sinus), RR 27, /78, 100% on 10 L venti mask. She was in no acute distress. Diffusely volume overloaded on exam. Lung exam revealed decreased air entry w/o active wheeze/rhonchi. WBC 19.19 (while on steroids and lower than 21.7 at OSH), Hb 10.9, INR 1.2, K 3.7, CO2 43, Cr 0.7, AST 12, ALT 20, T bili 0.5, MG 2.2, , Tn 0.020.      She reports in addition to chronic smoking, she was also exposed to  industrial chemicals 12 years ago in [place], [place] when the town had been evacuated. After this, her respiratory symptoms had surfaced. She reports compliance with all her inhalers. Reports quitting smoking 15 years ago. Denies ETOH or drug use. Also her and her  used to have 140 birds as pets, including parrots. In addition prior to rapid worsening of her symptoms 2 weeks PTP to the OSH, she had been noticing increased O2 requirements from 2LNC up to 5LNC in the last 6 months.

## 2017-07-17 NOTE — PLAN OF CARE
"Problem: Patient Care Overview  Goal: Plan of Care Review  Outcome: Ongoing (interventions implemented as appropriate)  Pt remain free of falls, injury, and complaints throughout the shift. Generalized skin remains CDI with mild generalized edema noted. Pt de-sats resting on RA and with activity on supp O2. Pt tachycardic throughout the shift and experiences SOB and MARIN; states "I cannot catch my breath." Team aware, as she has pulmonary HTN and COPD. Pt on HFNC and maintains O2 >90%. V/Q scan done today; no evidence of PE.  Lasix gtt infused throughout the shift per order. Glucose is diet controlled. Pt tolerating plan of care.       "

## 2017-07-17 NOTE — NURSING
7/17/2017 04:59   CO2 44 (HH)     CO2 trending down; pt able to tolerate BiPAP through most of the night.  Dr. Starks notified. No new orders at this time.

## 2017-07-17 NOTE — HOSPITAL COURSE
The patient has been diuresed with a lasix gtt. Evaluated by pulmonary who recommend to continue with baseline COPD regimen. ID recommend no ABx as the patient has completed course of ABx in OSH. Currently undergoing workup for pulmonary HTN.

## 2017-07-17 NOTE — ASSESSMENT & PLAN NOTE
- WHO class pending. Likely III.   - RHC tomorrow to assess PA pressures  - V/Q low probability for PE  - PASP 66, RAP 3 (TTE 7/15/17)  - nebs ATC/PRN  - c/w prednisone  - Pulmonary/ID doubt pneumonia, abx stopped -- consults appreciated  - repeat CT chest after appropriate diuresis to eval for ILD  - d/c lasix gtt, change to lasix 80 mg IV BID  - 2 gm NA/1500 mL fluid restric/ I/Os / Dw  - PFTs needed  - MORE, anti-dsDNA pending  - Anti SSA, SSB, AHA, CCP, Rheum factor negative, C3 75, C4 9 (low)  - c/w ICS/LABA

## 2017-07-18 LAB
ALBUMIN SERPL BCP-MCNC: 2.7 G/DL
ALP SERPL-CCNC: 67 U/L
ALT SERPL W/O P-5'-P-CCNC: 19 U/L
ANION GAP SERPL CALC-SCNC: 15 MMOL/L
AST SERPL-CCNC: 14 U/L
BASOPHILS # BLD AUTO: 0 K/UL
BASOPHILS NFR BLD: 0 %
BILIRUB SERPL-MCNC: 0.6 MG/DL
BUN SERPL-MCNC: 23 MG/DL
CALCIUM SERPL-MCNC: 9.6 MG/DL
CHLORIDE SERPL-SCNC: 86 MMOL/L
CO2 SERPL-SCNC: 40 MMOL/L
CREAT SERPL-MCNC: 0.6 MG/DL
DIFFERENTIAL METHOD: ABNORMAL
EOSINOPHIL # BLD AUTO: 0 K/UL
EOSINOPHIL NFR BLD: 0.1 %
ERYTHROCYTE [DISTWIDTH] IN BLOOD BY AUTOMATED COUNT: 16.5 %
EST. GFR  (AFRICAN AMERICAN): >60 ML/MIN/1.73 M^2
EST. GFR  (NON AFRICAN AMERICAN): >60 ML/MIN/1.73 M^2
GLUCOSE SERPL-MCNC: 84 MG/DL
HCT VFR BLD AUTO: 37.2 %
HGB BLD-MCNC: 10.7 G/DL
LYMPHOCYTES # BLD AUTO: 0.8 K/UL
LYMPHOCYTES NFR BLD: 4.9 %
MAGNESIUM SERPL-MCNC: 2.3 MG/DL
MCH RBC QN AUTO: 25.5 PG
MCHC RBC AUTO-ENTMCNC: 28.8 %
MCV RBC AUTO: 89 FL
MONOCYTES # BLD AUTO: 0.9 K/UL
MONOCYTES NFR BLD: 5.5 %
NEUTROPHILS # BLD AUTO: 14.5 K/UL
NEUTROPHILS NFR BLD: 88.9 %
PHOSPHATE SERPL-MCNC: 4 MG/DL
PLATELET # BLD AUTO: 199 K/UL
PMV BLD AUTO: 9.6 FL
POTASSIUM SERPL-SCNC: 3.3 MMOL/L
PROT SERPL-MCNC: 6.1 G/DL
RBC # BLD AUTO: 4.19 M/UL
SODIUM SERPL-SCNC: 141 MMOL/L
WBC # BLD AUTO: 16.26 K/UL

## 2017-07-18 PROCEDURE — 97162 PT EVAL MOD COMPLEX 30 MIN: CPT

## 2017-07-18 PROCEDURE — 83735 ASSAY OF MAGNESIUM: CPT

## 2017-07-18 PROCEDURE — 25000003 PHARM REV CODE 250: Performed by: INTERNAL MEDICINE

## 2017-07-18 PROCEDURE — C1894 INTRO/SHEATH, NON-LASER: HCPCS

## 2017-07-18 PROCEDURE — 94761 N-INVAS EAR/PLS OXIMETRY MLT: CPT

## 2017-07-18 PROCEDURE — 25000003 PHARM REV CODE 250: Performed by: STUDENT IN AN ORGANIZED HEALTH CARE EDUCATION/TRAINING PROGRAM

## 2017-07-18 PROCEDURE — 63600175 PHARM REV CODE 636 W HCPCS: Performed by: STUDENT IN AN ORGANIZED HEALTH CARE EDUCATION/TRAINING PROGRAM

## 2017-07-18 PROCEDURE — 97530 THERAPEUTIC ACTIVITIES: CPT

## 2017-07-18 PROCEDURE — 99024 POSTOP FOLLOW-UP VISIT: CPT | Mod: ,,, | Performed by: INTERNAL MEDICINE

## 2017-07-18 PROCEDURE — 94660 CPAP INITIATION&MGMT: CPT

## 2017-07-18 PROCEDURE — 27100171 HC OXYGEN HIGH FLOW UP TO 24 HOURS

## 2017-07-18 PROCEDURE — 87088 URINE BACTERIA CULTURE: CPT

## 2017-07-18 PROCEDURE — 36415 COLL VENOUS BLD VENIPUNCTURE: CPT

## 2017-07-18 PROCEDURE — 99900035 HC TECH TIME PER 15 MIN (STAT)

## 2017-07-18 PROCEDURE — 84100 ASSAY OF PHOSPHORUS: CPT

## 2017-07-18 PROCEDURE — 27000221 HC OXYGEN, UP TO 24 HOURS

## 2017-07-18 PROCEDURE — 25000242 PHARM REV CODE 250 ALT 637 W/ HCPCS: Performed by: STUDENT IN AN ORGANIZED HEALTH CARE EDUCATION/TRAINING PROGRAM

## 2017-07-18 PROCEDURE — 20600001 HC STEP DOWN PRIVATE ROOM

## 2017-07-18 PROCEDURE — 94640 AIRWAY INHALATION TREATMENT: CPT

## 2017-07-18 PROCEDURE — 4A03353 MEASUREMENT OF ARTERIAL FLOW, PULMONARY, PERCUTANEOUS APPROACH: ICD-10-PCS | Performed by: INTERNAL MEDICINE

## 2017-07-18 PROCEDURE — 25000003 PHARM REV CODE 250

## 2017-07-18 PROCEDURE — 4A023N6 MEASUREMENT OF CARDIAC SAMPLING AND PRESSURE, RIGHT HEART, PERCUTANEOUS APPROACH: ICD-10-PCS | Performed by: INTERNAL MEDICINE

## 2017-07-18 PROCEDURE — 94760 N-INVAS EAR/PLS OXIMETRY 1: CPT

## 2017-07-18 PROCEDURE — 93463 DRUG ADMIN & HEMODYNMIC MEAS: CPT | Mod: ,,, | Performed by: INTERNAL MEDICINE

## 2017-07-18 PROCEDURE — 93451 RIGHT HEART CATH: CPT | Mod: 26,,, | Performed by: INTERNAL MEDICINE

## 2017-07-18 PROCEDURE — 85025 COMPLETE CBC W/AUTO DIFF WBC: CPT

## 2017-07-18 PROCEDURE — 87040 BLOOD CULTURE FOR BACTERIA: CPT | Mod: 59

## 2017-07-18 PROCEDURE — 80053 COMPREHEN METABOLIC PANEL: CPT

## 2017-07-18 PROCEDURE — 87086 URINE CULTURE/COLONY COUNT: CPT

## 2017-07-18 RX ORDER — ALPRAZOLAM 0.25 MG/1
0.25 TABLET ORAL ONCE
Status: COMPLETED | OUTPATIENT
Start: 2017-07-18 | End: 2017-07-18

## 2017-07-18 RX ORDER — POTASSIUM CHLORIDE 20 MEQ/15ML
20 SOLUTION ORAL ONCE
Status: COMPLETED | OUTPATIENT
Start: 2017-07-18 | End: 2017-07-18

## 2017-07-18 RX ORDER — POTASSIUM CHLORIDE 20 MEQ/15ML
60 SOLUTION ORAL DAILY
Status: DISCONTINUED | OUTPATIENT
Start: 2017-07-18 | End: 2017-07-22 | Stop reason: HOSPADM

## 2017-07-18 RX ADMIN — ALBUTEROL SULFATE 2.5 MG: 2.5 SOLUTION RESPIRATORY (INHALATION) at 07:07

## 2017-07-18 RX ADMIN — FLUTICASONE FUROATE AND VILANTEROL TRIFENATATE 1 PUFF: 100; 25 POWDER RESPIRATORY (INHALATION) at 08:07

## 2017-07-18 RX ADMIN — Medication 3 ML: at 04:07

## 2017-07-18 RX ADMIN — ALBUTEROL SULFATE 2.5 MG: 2.5 SOLUTION RESPIRATORY (INHALATION) at 11:07

## 2017-07-18 RX ADMIN — POTASSIUM CHLORIDE 40 MEQ: 20 SOLUTION ORAL at 08:07

## 2017-07-18 RX ADMIN — FUROSEMIDE 80 MG: 10 INJECTION, SOLUTION INTRAVENOUS at 08:07

## 2017-07-18 RX ADMIN — VANCOMYCIN HYDROCHLORIDE 1000 MG: 1 INJECTION, POWDER, LYOPHILIZED, FOR SOLUTION INTRAVENOUS at 10:07

## 2017-07-18 RX ADMIN — ALBUTEROL SULFATE 2.5 MG: 2.5 SOLUTION RESPIRATORY (INHALATION) at 03:07

## 2017-07-18 RX ADMIN — Medication 3 ML: at 08:07

## 2017-07-18 RX ADMIN — PREDNISONE 20 MG: 20 TABLET ORAL at 08:07

## 2017-07-18 RX ADMIN — FUROSEMIDE 80 MG: 10 INJECTION, SOLUTION INTRAVENOUS at 07:07

## 2017-07-18 RX ADMIN — POTASSIUM CHLORIDE 20 MEQ: 20 SOLUTION ORAL at 11:07

## 2017-07-18 RX ADMIN — TIOTROPIUM BROMIDE 18 MCG: 18 CAPSULE ORAL; RESPIRATORY (INHALATION) at 08:07

## 2017-07-18 RX ADMIN — ALPRAZOLAM 0.25 MG: 0.25 TABLET ORAL at 03:07

## 2017-07-18 RX ADMIN — VANCOMYCIN HYDROCHLORIDE 1000 MG: 1 INJECTION, POWDER, LYOPHILIZED, FOR SOLUTION INTRAVENOUS at 08:07

## 2017-07-18 RX ADMIN — FLUOXETINE 10 MG: 10 CAPSULE ORAL at 08:07

## 2017-07-18 RX ADMIN — ALBUTEROL SULFATE 2.5 MG: 2.5 SOLUTION RESPIRATORY (INHALATION) at 04:07

## 2017-07-18 NOTE — CONSULTS
Case d/w team just now on patient well known to me. Probable Staph growing in 1/2 admission blood cultures in asymptomatic patient. Patient afebrile with downtrending leukocytosis off antibiotics and has no indwelling IV catheters. I suspect this will be a contaminant coag-negative Staph species. Would cover empirically with vancomycin until this can be confirmed tomorrow. If confirmed to be a coag-negative Staph then, no further workup required and would stop antibiotics. If S.aureus surprisingly grows, we will give further recommendations at that time.    Alannah Espinal MD  ID Attending  [phone]

## 2017-07-18 NOTE — PLAN OF CARE
Problem: Patient Care Overview  Goal: Plan of Care Review  Outcome: Ongoing (interventions implemented as appropriate)  Pt remain free of falls, injry, and complaints throughout the shift. Generalized skin remains CDI with facial and generalized edema noted; VSS. Pt diuresing well with lasix IVP. Pt currently in RHC procedural area now. Pt did not get 2 breathing treatments today and de-satted 70's on 5L HFNC got anxious and SOB @1420; team notified. O2 titrated to 6L HFNC and PRN Xanax given. Pt doing much better and remains >90% on 6L HFNC.Blood Cx resulted positive for gram positive cocci clusters resembling staph; 1G vanc given and ID consulted. POC discussed and updated. Pt tolerating plan of care.

## 2017-07-18 NOTE — PROGRESS NOTES
Pt left for cath via stretcher on 8L O2 HFNC accompanited by RN to [facility] procedural room. Upon entering, RN titrated O2 to 6L humidified HFNC. SpO2 >90%. Awaiting return.

## 2017-07-18 NOTE — SUBJECTIVE & OBJECTIVE
Interval History: NAEON. Feels well and denies any complaints. Continues to undergo PH work-up. Pending RHC at 3pm today.    Continuous Infusions:   Scheduled Meds:   albuterol sulfate  2.5 mg Nebulization Q4H    enoxaparin  40 mg Subcutaneous Daily    fluoxetine  10 mg Oral Daily    fluticasone-vilanterol  1 puff Inhalation Daily    furosemide  80 mg Intravenous BID    potassium chloride 10%  60 mEq Oral Daily    predniSONE  20 mg Oral Daily    sodium chloride 0.9%  3 mL Intravenous Q8H    tiotropium  1 capsule Inhalation Daily    vancomycin (VANCOCIN) IVPB  15 mg/kg (Dosing Weight) Intravenous Q12H     PRN Meds:    Review of patient's allergies indicates:  No Known Allergies  Objective:     Vital Signs (Most Recent):  Temp: 97.4 °F (36.3 °C) (07/18/17 1200)  Pulse: (!) 120 (07/18/17 1412)  Resp: 18 (07/18/17 1200)  BP: 108/63 (07/18/17 1200)  SpO2: (!) 91 % (07/18/17 1417) Vital Signs (24h Range):  Temp:  [97.1 °F (36.2 °C)-98.6 °F (37 °C)] 97.4 °F (36.3 °C)  Pulse:  [] 120  Resp:  [16-23] 18  SpO2:  [90 %-97 %] 91 %  BP: (103-126)/(61-81) 108/63     Weight: 65.7 kg (144 lb 13.5 oz)  Body mass index is 28.29 kg/m².      Intake/Output Summary (Last 24 hours) at 07/18/17 1511  Last data filed at 07/18/17 1400   Gross per 24 hour   Intake              810 ml   Output             1125 ml   Net             -315 ml       Hemodynamic Parameters:       Telemetry: no events    Physical Exam  Constitutional: NAD, conversant  HEENT: Sclera anicteric, PERRLA, EOMI  Neck: Unable to visualize JVD, no carotid bruits  CV: Regular rhythm, Tachycardic, no murmur, S1/S2 with loud P2 component, No Pericardial rub  Pulm: decreased air entry  GI: Abdomen soft, NTND, +BS  Extremities: 1+ LE edema, warm and well perfused, No cyanosis, No clubbing  Skin: No ecchymosis, erythema, or ulcers  Psych: AOx3, appropriate affect  Neuro: CNII-XII intact, no focal deficits      Significant Labs:  CBC:    Recent Labs  Lab  07/18/17  0554   WBC 16.26*   RBC 4.19   HGB 10.7*   HCT 37.2      MCV 89   MCH 25.5*   MCHC 28.8*     BNP:  No results for input(s): BNP in the last 72 hours.    Invalid input(s): BNPTRIAGELBLO  CMP:    Recent Labs  Lab 07/18/17  0554   GLU 84   CALCIUM 9.6   ALBUMIN 2.7*   PROT 6.1      K 3.3*   CO2 40*   CL 86*   BUN 23*   CREATININE 0.6   ALKPHOS 67   ALT 19   AST 14   BILITOT 0.6      Coagulation:   No results for input(s): INR, APTT in the last 72 hours.    Invalid input(s): PT  LDH:  No results for input(s): LDH in the last 72 hours.  Microbiology:  Microbiology Results (last 7 days)     Procedure Component Value Units Date/Time    Urine culture [[id number]] Collected:  07/18/17 0958    Order Status:  Sent Specimen:  Urine from Urine, Clean Catch Updated:  07/18/17 1239    Blood culture [[id number]] Collected:  07/18/17 0911    Order Status:  Sent Specimen:  Blood Updated:  07/18/17 1115    Blood culture [[id number]] Collected:  07/18/17 0911    Order Status:  Sent Specimen:  Blood Updated:  07/18/17 1115    Blood culture [[id number]] Collected:  07/15/17 0330    Order Status:  Completed Specimen:  Blood from Peripheral, Antecubital, Right Updated:  07/18/17 0825     Blood Culture, Routine Gram stain aer bottle: Gram positive cocci in clusters resembling Staph      Blood Culture, Routine Results called to and read back by: Damaris Malave RN 07/18/2017  08:24    Blood culture [[id number]] Collected:  07/15/17 0345    Order Status:  Completed Specimen:  Blood from Peripheral, Antecubital, Left Updated:  07/18/17 0812     Blood Culture, Routine No Growth to date     Blood Culture, Routine No Growth to date     Blood Culture, Routine No Growth to date     Blood Culture, Routine No Growth to date          I have reviewed all pertinent labs within the past 24 hours.    Estimated Creatinine Clearance: 85.4 mL/min (based on Cr of 0.6).    Diagnostic Results:  I have reviewed and interpreted all pertinent  imaging results/findings within the past 24 hours.

## 2017-07-18 NOTE — PLAN OF CARE
Problem: Physical Therapy Goal  Goal: Physical Therapy Goal  Goals to be met by: 17     Patient will increase functional independence with mobility by performin. Supine to sit with Modified [place]  2. Sit to supine with Modified [place]  3. Sit to stand transfer with Supervision  4. Gait  x 50 feet with Stand-by Assistance using Rolling Walker or appropriate AD.   5. Lower extremity exercise program x15 reps per handout, with supervision  6. Pt's  to demonstrate good understanding of (A) pt prn upon d/c  7. Pt to ascend/descend 4 steps with HHA and min assist for support.      Outcome: Ongoing (interventions implemented as appropriate)  PT eval completed.  Pt required only CGA for mobility but is limited by poor activity felipe due to O2 desaturation to 79% with t/f to b/s chair.  Pt was able to return to 91% with increase O2 to 6L and instruction on pursed lip breathing technique but could not maintain with activity.  Pt should be able to d/c home with her  and HHPT when medically appropriate.

## 2017-07-18 NOTE — PT/OT/SLP EVAL
Physical Therapy  Evaluation    Joy Black   MRN: [id number]   Admitting Diagnosis: Pulmonary hypertension    PT Received On: 07/18/17  PT Start Time: 1116     PT Stop Time: 1152    PT Total Time (min): 36 min       Billable Minutes:  Evaluation 25 and Therapeutic Activity 11    Diagnosis: Pulmonary hypertension  Pt presented to OSH on 7/5/17 after 2 wk h/o of increasing SOB, increase LE Edema, productive cough and O2 stats 49-60%.  Pt was t/f'd to [facility] on 7/14/17 for evel of severe pulm HTN and RHF.      Past Medical History:   Diagnosis Date    Arthritis     Cancer     Cervical cancer 1980s - remission    COPD (chronic obstructive pulmonary disease)     3.5 L home O2 (for 8 years)    Emphysema, unspecified     Emphysema/COPD       Past Surgical History:   Procedure Laterality Date    TONSILLECTOMY         Referring physician: Johnny De La Torre MD  Date referred to PT: 7/18/17    General Precautions: Standard, fall  Orthopedic Precautions: N/A   Braces: N/A       Do you have any cultural, spiritual, Pentec[employer]al conflicts, given your current situation?: None    Patient History:  Lives With: spouse  Living Arrangements: house  Home Accessibility: stairs to enter home  Number of Stairs to Enter Home: 4  Stair Railings at Home: none  Living Environment Comment: Pt lives with her   who works during the day.  Prior to 2wks before start of this hospitalization , pt was (I) wiith gait at community level with continous O2, and was (I) with ADL's.  She did need increase time for all tasks and supplemental O2 support.  During the 2 wks before she went to OSH on 7/5/17 she needed increase assistance and decreased activity felipe.   Equipment Currently Used at Home: none  DME owned (not currently used): rolling walker    Previous Level of Function:  Ambulation Skills: independent (except for 2 wks prior to hospitalization at OSH)  Transfer Skills: independent (except for 2 wks prior to hospitalization at OSH)  ADL Skills:  "needs assist  Work/Leisure Activity: independent (except for 2 wks prior to hospitalization at OSH)    Subjective:  Communicated with Damaris yo,  prior to session.  Pt reports she has been getting up to the b/s commode but gets SOB.   Chief Complaint: SOB, " I feel like I can't catch my breath"  Patient goals: to go home and not be on the ventilator    Pain/Comfort  Pain Rating 1: 8/10  Location - Side 1: Bilateral  Location 1: leg  Pain Addressed 1: Reposition, Distraction  Pain Rating Post-Intervention 1: 8/10      Objective:   Patient found with: oxygen, peripheral IV, telemetry     Cognitive Exam:  Oriented to: Person, Place and Situation    Follows Commands/attention: Follows multistep  commands  Communication: clear/fluent  Safety awareness/insight to disability: intact    Physical Exam:  Postural examination/scapula alignment: Rounded shoulder, forward head posture    Skin integrity: Thin  Edema: Moderate B LE's    Sensation:   Intact    Lower Extremity Range of Motion:  Right Lower Extremity: WFL  Left Lower Extremity: WFL    Lower Extremity Strength:  Right Lower Extremity: WFL  Left Lower Extremity: WFL     Gross motor coordination: WFL    Functional Mobility:  Bed Mobility:  Rolling/Turning to Left: Supervision, With side rail  Scooting/Bridging: Stand by Assistance  Supine to Sit: Stand by Assistance, With side rail (and HOB at 30 degrees)    Transfers:  Sit <> Stand Assistance: Contact Guard Assistance  Sit <> Stand Assistive Device: No Assistive Device    Gait:   Gait Distance: 5 steps to b/s chair.   Marching in place x 10 reps  Assistance 1: Contact Guard Assistance  Gait Assistive Device: No device  Gait Pattern: 2-point gait  Gait Deviation(s): decreased shakir, increased time in double stance, decreased velocity of limb motion      Balance:   Static Sit: GOOD: Takes MODERATE challenges from all directions  Dynamic Sit: GOOD: Maintains balance through MODERATE excursions of active trunk " movement  Static Stand: FAIR: Maintains without assist but unable to take challenges  Dynamic stand: FAIR: Needs CONTACT GUARD during gait    Therapeutic Activities and Exercises:  PT ed pt and her  on PT POC, pursed lip breathing and proper breathing techniques they verbalized/ demonstrated good understanding back to PT. Pt instructed on and performed AP x 15 reps in supine.  PT attempted to have pt perform seated ex's but pt's O2 sats were at 79% after sitting in the chair. nsg present in room after t/f and okayed increasing pt's O2 to 6L. Pt instructed on pursed lip breathing and posture and she was able to return to 91% but not able to maintain with activity and no seated ex's performed.    White board updated in pt's room. Pt instructed to not get up on her own but with (A) in the room. She verbalized good understanding.     AM-PAC 6 CLICK MOBILITY  How much help from another person does this patient currently need?   1 = Unable, Total/Dependent Assistance  2 = A lot, Maximum/Moderate Assistance  3 = A little, Minimum/Contact Guard/Supervision  4 = None, Modified Bureau/Independent    Turning over in bed (including adjusting bedclothes, sheets and blankets)?: 4  Sitting down on and standing up from a chair with arms (e.g., wheelchair, bedside commode, etc.): 3  Moving from lying on back to sitting on the side of the bed?: 4  Moving to and from a bed to a chair (including a wheelchair)?: 3  Need to walk in hospital room?: 3  Climbing 3-5 steps with a railing?: 1  Total Score: 18     AM-PAC Raw Score CMS G-Code Modifier Level of Impairment Assistance   6 % Total / Unable   7 - 9 CM 80 - 100% Maximal Assist   10 - 14 CL 60 - 80% Moderate Assist   15 - 19 CK 40 - 60% Moderate Assist   20 - 22 CJ 20 - 40% Minimal Assist   23 CI 1-20% SBA / CGA   24 CH 0% Independent/ Mod I     Patient left up in chair with all lines intact, call button in reach, nsg,  notified and  present.    Assessment:    Joy Black is a 61 y.o. female with a medical diagnosis of Pulmonary hypertension and presents with poor activity tolerance, deconditioning and this is contributing to her decrease (I) with all functional mobility and the deficits listed below. Pt will benefit from PT to address all of her deficits to maximize her activity felipe and functional (I).  Pt is a high fall risk due to O2 desaturation and debility due to long term hospitalization.  Pt's  appears able to (A) her after d/c but she will need (A) during the day when he is at work. Pt is highly motivated to work with PT and improve her endurance and (I).    Personal factors/comorbidities: COPD, R Heart Failure, on home O2. Due to the listed comorbidities the patient cannot fully participate in PT POC.  Body systems elements affected: Lower, extremities, neuromuscular system, cardivascular, pulmonary system,   Clinical Presentation: unstable  Functional Outcome Tools: AMPAC, MMT, ROM,   Evaluation Complexity Level: Moderate    Rehab identified problem list/impairments: Rehab identified problem list/impairments: impaired endurance, impaired functional mobilty, gait instability, impaired balance, pain, impaired cardiopulmonary response to activity    Rehab potential is fair due to endurance    Activity tolerance: Poor due to O2 desaturation    Discharge recommendations: Discharge Facility/Level Of Care Needs: home health PT (and (A) from family/friends due to poor activity tolerance.)     Barriers to discharge: Barriers to Discharge: Inaccessible home environment, Decreased caregiver support    Equipment recommendations: Equipment Needed After Discharge: bedside commode, walker, rolling, bath bench     GOALS:    Physical Therapy Goals        Problem: Physical Therapy Goal    Goal Priority Disciplines Outcome Goal Variances Interventions   Physical Therapy Goal     PT/OT, PT Ongoing (interventions implemented as appropriate)     Description:  Goals to be  met by: 17     Patient will increase functional independence with mobility by performin. Supine to sit with Modified [place]  2. Sit to supine with Modified [place]  3. Sit to stand transfer with Supervision  4. Gait  x 50 feet with Stand-by Assistance using Rolling Walker or appropriate AD.   5. Lower extremity exercise program x15 reps per handout, with supervision  6. Pt's  to demonstrate good understanding of (A) pt prn upon d/c  7. Pt to ascend/descend 4 steps with HHA and min assist for support.                        PLAN:    Patient to be seen 4 x/week to address the above listed problems via gait training, therapeutic activities, therapeutic exercises  Plan of Care expires: 17  Plan of Care reviewed with: patient, spouse          Jaci ROSS Vann, PT  2017

## 2017-07-18 NOTE — PROGRESS NOTES
Patient well known to me from hospital rotation and referred for RHC to assess PA pressure.  Labs reviewed and patient examined. Potassium replaced this morning.    This procedure has been fully reviewed with the patient and written informed consent has been obtained.

## 2017-07-18 NOTE — PROGRESS NOTES
[facility]  Heart Transplant  Progress Note    Patient Name: Joy Black  MRN: [id number]  Admission Date: 7/14/2017  Hospital Length of Stay: 4 days  Attending Physician: Erich Medley Jr.,*  Primary Care Provider: Jim Hernandez MD  Principal Problem:Pulmonary hypertension    Subjective:     Interval History: NAEON. Feels well and denies any complaints. Continues to undergo PH work-up. Pending RHC at 3pm today.    Continuous Infusions:   Scheduled Meds:   albuterol sulfate  2.5 mg Nebulization Q4H    enoxaparin  40 mg Subcutaneous Daily    fluoxetine  10 mg Oral Daily    fluticasone-vilanterol  1 puff Inhalation Daily    furosemide  80 mg Intravenous BID    potassium chloride 10%  60 mEq Oral Daily    predniSONE  20 mg Oral Daily    sodium chloride 0.9%  3 mL Intravenous Q8H    tiotropium  1 capsule Inhalation Daily    vancomycin (VANCOCIN) IVPB  15 mg/kg (Dosing Weight) Intravenous Q12H     PRN Meds:    Review of patient's allergies indicates:  No Known Allergies  Objective:     Vital Signs (Most Recent):  Temp: 97.4 °F (36.3 °C) (07/18/17 1200)  Pulse: (!) 120 (07/18/17 1412)  Resp: 18 (07/18/17 1200)  BP: 108/63 (07/18/17 1200)  SpO2: (!) 91 % (07/18/17 1417) Vital Signs (24h Range):  Temp:  [97.1 °F (36.2 °C)-98.6 °F (37 °C)] 97.4 °F (36.3 °C)  Pulse:  [] 120  Resp:  [16-23] 18  SpO2:  [90 %-97 %] 91 %  BP: (103-126)/(61-81) 108/63     Weight: 65.7 kg (144 lb 13.5 oz)  Body mass index is 28.29 kg/m².      Intake/Output Summary (Last 24 hours) at 07/18/17 1511  Last data filed at 07/18/17 1400   Gross per 24 hour   Intake              810 ml   Output             1125 ml   Net             -315 ml       Hemodynamic Parameters:       Telemetry: no events    Physical Exam  Constitutional: NAD, conversant  HEENT: Sclera anicteric, PERRLA, EOMI  Neck: Unable to visualize JVD, no carotid bruits  CV: Regular rhythm, Tachycardic, no murmur, S1/S2 with loud P2 component, No  Pericardial rub  Pulm: decreased air entry  GI: Abdomen soft, NTND, +BS  Extremities: 1+ LE edema, warm and well perfused, No cyanosis, No clubbing  Skin: No ecchymosis, erythema, or ulcers  Psych: AOx3, appropriate affect  Neuro: CNII-XII intact, no focal deficits      Significant Labs:  CBC:    Recent Labs  Lab 07/18/17  0554   WBC 16.26*   RBC 4.19   HGB 10.7*   HCT 37.2      MCV 89   MCH 25.5*   MCHC 28.8*     BNP:  No results for input(s): BNP in the last 72 hours.    Invalid input(s): BNPTRIAGELBLO  CMP:    Recent Labs  Lab 07/18/17  0554   GLU 84   CALCIUM 9.6   ALBUMIN 2.7*   PROT 6.1      K 3.3*   CO2 40*   CL 86*   BUN 23*   CREATININE 0.6   ALKPHOS 67   ALT 19   AST 14   BILITOT 0.6      Coagulation:   No results for input(s): INR, APTT in the last 72 hours.    Invalid input(s): PT  LDH:  No results for input(s): LDH in the last 72 hours.  Microbiology:  Microbiology Results (last 7 days)     Procedure Component Value Units Date/Time    Urine culture [[id number]] Collected:  07/18/17 0958    Order Status:  Sent Specimen:  Urine from Urine, Clean Catch Updated:  07/18/17 1239    Blood culture [[id number]] Collected:  07/18/17 0911    Order Status:  Sent Specimen:  Blood Updated:  07/18/17 1115    Blood culture [[id number]] Collected:  07/18/17 0911    Order Status:  Sent Specimen:  Blood Updated:  07/18/17 1115    Blood culture [[id number]] Collected:  07/15/17 0330    Order Status:  Completed Specimen:  Blood from Peripheral, Antecubital, Right Updated:  07/18/17 0825     Blood Culture, Routine Gram stain aer bottle: Gram positive cocci in clusters resembling Staph      Blood Culture, Routine Results called to and read back by: Damaris Malave RN 07/18/2017  08:24    Blood culture [[id number]] Collected:  07/15/17 0345    Order Status:  Completed Specimen:  Blood from Peripheral, Antecubital, Left Updated:  07/18/17 0812     Blood Culture, Routine No Growth to date     Blood Culture, Routine No  Growth to date     Blood Culture, Routine No Growth to date     Blood Culture, Routine No Growth to date          I have reviewed all pertinent labs within the past 24 hours.    Estimated Creatinine Clearance: 85.4 mL/min (based on Cr of 0.6).    Diagnostic Results:  I have reviewed and interpreted all pertinent imaging results/findings within the past 24 hours.    Assessment and Plan:     * Pulmonary hypertension    - WHO class pending. Likely III.   - RHC today  - V/Q low probability for PE  - PASP 66, RAP 3 (TTE 7/15/17)  - nebs ATC/PRN  - c/w prednisone  - Pulmonary/ID doubt pneumonia, abx stopped -- consults appreciated  - repeat CT chest after appropriate diuresis to eval for ILD  - c/w lasix 80 mg IV BID  - 2 gm NA/1500 mL fluid restric/ I/Os / Dw  - PFTs needed  - MORE pending, anti-dsDNA negative  - Anti SSA, SSB, AHA, CCP, Rheum factor negative, C3 75, C4 9 (low)  - c/w ICS/LABA        Cor pulmonale    - c/w lasix 80 mg IV BID  - I/Os, daily weights  - O2 PRN  - PT/OT        Acute on chronic respiratory failure    - O2 PRN  - due to RHF/COPD        Centrilobular emphysema    - c/w nebs  - c/w prednisone  - c/w albuterol/tiotropium/LABA/ICS  - pulm input appreciated          Case discussed with attending.    Florina De La Torre MD  Heart Transplant  [facility]

## 2017-07-18 NOTE — PROGRESS NOTES
RHC Lab Post Procedure Note    Patient tolerated the procedure well.   She has PAH with PCWP approx 15  Given nitric oxide without significant improvement in PA pressure though CO did increase and wedge did NOT increase.  There were no complications.   Please see full report in [facility] for details.

## 2017-07-19 PROBLEM — R09.02 HYPOXIA: Status: RESOLVED | Noted: 2017-07-15 | Resolved: 2017-07-19

## 2017-07-19 LAB
ALBUMIN SERPL BCP-MCNC: 2.5 G/DL
ALP SERPL-CCNC: 65 U/L
ALT SERPL W/O P-5'-P-CCNC: 16 U/L
ANION GAP SERPL CALC-SCNC: 11 MMOL/L
ANION GAP SERPL CALC-SCNC: 9 MMOL/L
AST SERPL-CCNC: 14 U/L
BASOPHILS # BLD AUTO: 0.01 K/UL
BASOPHILS NFR BLD: 0.1 %
BILIRUB SERPL-MCNC: 0.5 MG/DL
BUN SERPL-MCNC: 18 MG/DL
BUN SERPL-MCNC: 23 MG/DL
CALCIUM SERPL-MCNC: 8.8 MG/DL
CALCIUM SERPL-MCNC: 8.8 MG/DL
CHLORIDE SERPL-SCNC: 88 MMOL/L
CHLORIDE SERPL-SCNC: 90 MMOL/L
CO2 SERPL-SCNC: 37 MMOL/L
CO2 SERPL-SCNC: 39 MMOL/L
CREAT SERPL-MCNC: 0.5 MG/DL
CREAT SERPL-MCNC: 0.7 MG/DL
DIFFERENTIAL METHOD: ABNORMAL
ENA JO1 AB SER IA-ACNC: <1 INDEX
EOSINOPHIL # BLD AUTO: 0.1 K/UL
EOSINOPHIL NFR BLD: 0.5 %
ERYTHROCYTE [DISTWIDTH] IN BLOOD BY AUTOMATED COUNT: 16.5 %
EST. GFR  (AFRICAN AMERICAN): >60 ML/MIN/1.73 M^2
EST. GFR  (AFRICAN AMERICAN): >60 ML/MIN/1.73 M^2
EST. GFR  (NON AFRICAN AMERICAN): >60 ML/MIN/1.73 M^2
EST. GFR  (NON AFRICAN AMERICAN): >60 ML/MIN/1.73 M^2
GLUCOSE SERPL-MCNC: 152 MG/DL
GLUCOSE SERPL-MCNC: 72 MG/DL
HCT VFR BLD AUTO: 34.7 %
HGB BLD-MCNC: 10.1 G/DL
LYMPHOCYTES # BLD AUTO: 1 K/UL
LYMPHOCYTES NFR BLD: 7.2 %
MAGNESIUM SERPL-MCNC: 1.9 MG/DL
MAGNESIUM SERPL-MCNC: 2.8 MG/DL
MCH RBC QN AUTO: 25.6 PG
MCHC RBC AUTO-ENTMCNC: 29.1 %
MCV RBC AUTO: 88 FL
MONOCYTES # BLD AUTO: 1 K/UL
MONOCYTES NFR BLD: 7.8 %
NEUTROPHILS # BLD AUTO: 11 K/UL
NEUTROPHILS NFR BLD: 83.1 %
PHOSPHATE SERPL-MCNC: 3.5 MG/DL
PLATELET # BLD AUTO: 244 K/UL
PMV BLD AUTO: 10.7 FL
POTASSIUM SERPL-SCNC: 2.9 MMOL/L
POTASSIUM SERPL-SCNC: 3.9 MMOL/L
PRE FEV1 FVC: 39
PRE FEV1: 0.41
PRE FVC: 1.05
PREDICTED FEV1 FVC: 83
PREDICTED FEV1: 2.12
PREDICTED FVC: 2.6
PROT SERPL-MCNC: 5.8 G/DL
RBC # BLD AUTO: 3.94 M/UL
SODIUM SERPL-SCNC: 136 MMOL/L
SODIUM SERPL-SCNC: 138 MMOL/L
WBC # BLD AUTO: 13.16 K/UL

## 2017-07-19 PROCEDURE — 84100 ASSAY OF PHOSPHORUS: CPT

## 2017-07-19 PROCEDURE — 85025 COMPLETE CBC W/AUTO DIFF WBC: CPT

## 2017-07-19 PROCEDURE — 94660 CPAP INITIATION&MGMT: CPT

## 2017-07-19 PROCEDURE — 27000221 HC OXYGEN, UP TO 24 HOURS

## 2017-07-19 PROCEDURE — 97116 GAIT TRAINING THERAPY: CPT

## 2017-07-19 PROCEDURE — 20600001 HC STEP DOWN PRIVATE ROOM

## 2017-07-19 PROCEDURE — 80053 COMPREHEN METABOLIC PANEL: CPT

## 2017-07-19 PROCEDURE — 99232 SBSQ HOSP IP/OBS MODERATE 35: CPT | Mod: ,,, | Performed by: INTERNAL MEDICINE

## 2017-07-19 PROCEDURE — 94010 BREATHING CAPACITY TEST: CPT | Performed by: INTERNAL MEDICINE

## 2017-07-19 PROCEDURE — 80048 BASIC METABOLIC PNL TOTAL CA: CPT

## 2017-07-19 PROCEDURE — 63600175 PHARM REV CODE 636 W HCPCS: Performed by: STUDENT IN AN ORGANIZED HEALTH CARE EDUCATION/TRAINING PROGRAM

## 2017-07-19 PROCEDURE — 25000242 PHARM REV CODE 250 ALT 637 W/ HCPCS: Performed by: STUDENT IN AN ORGANIZED HEALTH CARE EDUCATION/TRAINING PROGRAM

## 2017-07-19 PROCEDURE — 63600175 PHARM REV CODE 636 W HCPCS: Performed by: INTERNAL MEDICINE

## 2017-07-19 PROCEDURE — 99900035 HC TECH TIME PER 15 MIN (STAT)

## 2017-07-19 PROCEDURE — 83735 ASSAY OF MAGNESIUM: CPT

## 2017-07-19 PROCEDURE — 97110 THERAPEUTIC EXERCISES: CPT

## 2017-07-19 PROCEDURE — 83735 ASSAY OF MAGNESIUM: CPT | Mod: 91

## 2017-07-19 PROCEDURE — 36415 COLL VENOUS BLD VENIPUNCTURE: CPT

## 2017-07-19 PROCEDURE — 94761 N-INVAS EAR/PLS OXIMETRY MLT: CPT

## 2017-07-19 PROCEDURE — 25000003 PHARM REV CODE 250: Performed by: STUDENT IN AN ORGANIZED HEALTH CARE EDUCATION/TRAINING PROGRAM

## 2017-07-19 PROCEDURE — 97530 THERAPEUTIC ACTIVITIES: CPT

## 2017-07-19 PROCEDURE — 94640 AIRWAY INHALATION TREATMENT: CPT

## 2017-07-19 RX ORDER — MAGNESIUM SULFATE HEPTAHYDRATE 40 MG/ML
2 INJECTION, SOLUTION INTRAVENOUS ONCE
Status: COMPLETED | OUTPATIENT
Start: 2017-07-19 | End: 2017-07-19

## 2017-07-19 RX ADMIN — ALBUTEROL SULFATE 2.5 MG: 2.5 SOLUTION RESPIRATORY (INHALATION) at 04:07

## 2017-07-19 RX ADMIN — FLUTICASONE FUROATE AND VILANTEROL TRIFENATATE 1 PUFF: 100; 25 POWDER RESPIRATORY (INHALATION) at 08:07

## 2017-07-19 RX ADMIN — ALBUTEROL SULFATE 2.5 MG: 2.5 SOLUTION RESPIRATORY (INHALATION) at 08:07

## 2017-07-19 RX ADMIN — ALBUTEROL SULFATE 2.5 MG: 2.5 SOLUTION RESPIRATORY (INHALATION) at 03:07

## 2017-07-19 RX ADMIN — POTASSIUM CHLORIDE 60 MEQ: 20 SOLUTION ORAL at 08:07

## 2017-07-19 RX ADMIN — FUROSEMIDE 80 MG: 10 INJECTION, SOLUTION INTRAVENOUS at 05:07

## 2017-07-19 RX ADMIN — PREDNISONE 20 MG: 20 TABLET ORAL at 08:07

## 2017-07-19 RX ADMIN — ENOXAPARIN SODIUM 40 MG: 100 INJECTION SUBCUTANEOUS at 05:07

## 2017-07-19 RX ADMIN — TIOTROPIUM BROMIDE 18 MCG: 18 CAPSULE ORAL; RESPIRATORY (INHALATION) at 08:07

## 2017-07-19 RX ADMIN — ALBUTEROL SULFATE 2.5 MG: 2.5 SOLUTION RESPIRATORY (INHALATION) at 12:07

## 2017-07-19 RX ADMIN — Medication 3 ML: at 02:07

## 2017-07-19 RX ADMIN — FLUOXETINE 10 MG: 10 CAPSULE ORAL at 08:07

## 2017-07-19 RX ADMIN — VANCOMYCIN HYDROCHLORIDE 1000 MG: 1 INJECTION, POWDER, LYOPHILIZED, FOR SOLUTION INTRAVENOUS at 08:07

## 2017-07-19 RX ADMIN — Medication 3 ML: at 05:07

## 2017-07-19 RX ADMIN — MAGNESIUM SULFATE IN WATER 2 G: 40 INJECTION, SOLUTION INTRAVENOUS at 08:07

## 2017-07-19 RX ADMIN — FUROSEMIDE 80 MG: 10 INJECTION, SOLUTION INTRAVENOUS at 08:07

## 2017-07-19 NOTE — PROGRESS NOTES
[facility]  Heart Transplant  Progress Note    Patient Name: Joy Black  MRN: [id number]  Admission Date: 7/14/2017  Hospital Length of Stay: 5 days  Attending Physician: Erich Medley Jr.,*  Primary Care Provider: Jim Hernandez MD  Principal Problem:Pulmonary hypertension    Subjective:     Interval History: NAEON. Feels well and denies any complaints. S/p RHC yesterday which revealed elevated PAPs with elevated right sided filling pressures.    Continuous Infusions:   Scheduled Meds:   albuterol sulfate  2.5 mg Nebulization Q4H    enoxaparin  40 mg Subcutaneous Daily    fluoxetine  10 mg Oral Daily    fluticasone-vilanterol  1 puff Inhalation Daily    furosemide  80 mg Intravenous BID    potassium chloride 10%  60 mEq Oral Daily    predniSONE  20 mg Oral Daily    sodium chloride 0.9%  3 mL Intravenous Q8H    tiotropium  1 capsule Inhalation Daily     PRN Meds:    Review of patient's allergies indicates:  No Known Allergies  Objective:     Vital Signs (Most Recent):  Temp: 97.8 °F (36.6 °C) (07/19/17 1200)  Pulse: 110 (07/19/17 1230)  Resp: (!) 22 (07/19/17 1230)  BP: 116/62 (07/19/17 1200)  SpO2: (!) 92 % (07/19/17 1230) Vital Signs (24h Range):  Temp:  [97 °F (36.1 °C)-98.1 °F (36.7 °C)] 97.8 °F (36.6 °C)  Pulse:  [] 110  Resp:  [16-22] 22  SpO2:  [90 %-100 %] 92 %  BP: (116-144)/(62-74) 116/62     Weight: 65.9 kg (145 lb 4.5 oz)  Body mass index is 28.37 kg/m².      Intake/Output Summary (Last 24 hours) at 07/19/17 1234  Last data filed at 07/19/17 1110   Gross per 24 hour   Intake             1760 ml   Output             1925 ml   Net             -165 ml       Hemodynamic Parameters:       Telemetry: no events    Physical Exam   Constitutional: NAD, conversant  HEENT: Sclera anicteric, PERRLA, EOMI  Neck: Unable to visualize JVD, no carotid bruits  CV: Regular rhythm, Tachycardic, no murmur, S1/S2 with loud P2 component, No Pericardial rub  Pulm: decreased air  entry  GI: Abdomen soft, NTND, +BS  Extremities: 1+ LE edema, warm and well perfused, No cyanosis, No clubbing  Skin: No ecchymosis, erythema, or ulcers  Psych: AOx3, appropriate affect  Neuro: CNII-XII intact, no focal deficits    Significant Labs:  CBC:    Recent Labs  Lab 07/19/17 0441   WBC 13.16*   RBC 3.94*   HGB 10.1*   HCT 34.7*      MCV 88   MCH 25.6*   MCHC 29.1*     BNP:  No results for input(s): BNP in the last 72 hours.    Invalid input(s): BNPTRIAGELBLO  CMP:    Recent Labs  Lab 07/19/17 0441 07/19/17  1139   GLU 72 152*   CALCIUM 8.8 8.8   ALBUMIN 2.5*  --    PROT 5.8*  --     138   K 2.9* 3.9   CO2 37* 39*   CL 88* 90*   BUN 23* 18   CREATININE 0.5 0.7   ALKPHOS 65  --    ALT 16  --    AST 14  --    BILITOT 0.5  --       Coagulation:   No results for input(s): INR, APTT in the last 72 hours.    Invalid input(s): PT  LDH:  No results for input(s): LDH in the last 72 hours.  Microbiology:  Microbiology Results (last 7 days)     Procedure Component Value Units Date/Time    Blood culture [[id number]] Collected:  07/18/17 0911    Order Status:  Completed Specimen:  Blood Updated:  07/19/17 1212     Blood Culture, Routine No Growth to date     Blood Culture, Routine No Growth to date    Blood culture [[id number]] Collected:  07/18/17 0911    Order Status:  Completed Specimen:  Blood Updated:  07/19/17 1212     Blood Culture, Routine No Growth to date     Blood Culture, Routine No Growth to date    Blood culture [[id number]] Collected:  07/15/17 0330    Order Status:  Completed Specimen:  Blood from Peripheral, Antecubital, Right Updated:  07/19/17 0844     Blood Culture, Routine Gram stain aer bottle: Gram positive cocci in clusters resembling Staph      Blood Culture, Routine Results called to and read back by: Damaris Malave RN 07/18/2017  08:24     Blood Culture, Routine --     MICROCOCCUS SPECIES  Organism is a probable contaminant      Blood culture [[id number]] Collected:  07/15/17 0345     Order Status:  Completed Specimen:  Blood from Peripheral, Antecubital, Left Updated:  07/19/17 0812     Blood Culture, Routine No Growth to date     Blood Culture, Routine No Growth to date     Blood Culture, Routine No Growth to date     Blood Culture, Routine No Growth to date     Blood Culture, Routine No Growth to date    Urine culture [[id number]] Collected:  07/18/17 0958    Order Status:  Sent Specimen:  Urine from Urine, Clean Catch Updated:  07/18/17 1239          I have reviewed all pertinent labs within the past 24 hours.    Estimated Creatinine Clearance: 73.4 mL/min (based on Cr of 0.7).    Diagnostic Results:  I have reviewed and interpreted all pertinent imaging results/findings within the past 24 hours.    RHC (7/18/17):  AT BASELINE:   1.   Mildly elevated right atrial pressure.   2.   Mildly elevated mean PCWP pressure.   3.   Moderately elevated mean PA pressure with elevated PVR 6.7 Wood units.   4.   Systemic vascular resistance 1315.   5.   Normal CO/CI    FOLLOWING NITRIC OXIDE 80 PPM:   1.   Arterial saturation increases significantly.   2.   Pulmonary artery saturation increases significantly.   3.   Significant increase in CO/CI observed   4.   Pulmonary vascular resistance improves though remains substantially elevated at 5.2 Wood units.   5.   Vasoreactivity test is negative regarding any significant improvement in pulmonary arterial pressure.    BASELINE:  RA: 14/10 (10)  RV: 90/10  PW: 18/17 (15)  PW: 19/18 (16)  PA: 80/30 (47)  PA: 82/30 (47)  /72 (89)  AO_SAT: 93  PA_SAT: 62  LOUISA CO 4.81  LOUISA CI 2.95    NIRIC OXIDE 20 PPM.  PA 78/30 (46).    NITRIC OXIDE 40 PPM.  PA 74/30 (45).    NITRIC OXIDE 80 PPM.  PA 78/25 (43).  ART .  PA SAT 74.  LOUISA CO 5.73.  LOUISA CI 3.52.  PVR 5.2 Wood units.    Assessment and Plan:   61 y.o. woman with PMH of COPD on home O2 (12 years), DM, remote cervical Ca, HTN, smoker (1-2 PPD x 32 years) who is a transfer from [facility]  [place] for evaluation of severe PH with right heart failure.    * Pulmonary hypertension    - WHO class pending. Likely III.   - PFTs today  - V/Q low probability for PE  - PASP 66, RAP 3 (TTE 7/15/17)  - RHC (7/18/17) - RA 10 RV 90/10, PA 80/30 (47), PWP 15, CO 4.81, CI 2.95; post NO 20PPM PA 78/30 (46); NO 40PPM PA 74/30 (45); NO 80PPM PA 78/25 (43) with CO 5.73, CI 3.52, PVR 5.2 wood units  - nebs ATC/PRN  - c/w prednisone  - Pulmonary/ID doubt pneumonia, abx stopped -- consults appreciated (Bcx 7/15 gram statin revealed micrococcus which is contaminant)  - c/w lasix 80 mg IV BID  - 2 gm NA/1500 mL fluid restric/ I/Os / Dw  - PFTs needed  - MORE pending  - Anti dsDNA, Anti SSA, SSB, AHA, CCP, Rheum factor negative, C3 75, C4 9 (low)  - c/w ICS/LABA        Cor pulmonale    - c/w lasix 80 mg IV BID  - I/Os, daily weights  - O2 PRN  - PT/OT        Acute on chronic respiratory failure    - O2 PRN  - due to RHF/COPD        Centrilobular emphysema    - c/w nebs  - c/w prednisone  - c/w albuterol/tiotropium/LABA/ICS  - pulm input appreciated          Case discussed with attending.     Floirna De La Torre MD  Heart Transplant  [facility]

## 2017-07-19 NOTE — SUBJECTIVE & OBJECTIVE
Interval History: NAEON. Feels well and denies any complaints. S/p RHC yesterday which revealed elevated PAPs with elevated right sided filling pressures.    Continuous Infusions:   Scheduled Meds:   albuterol sulfate  2.5 mg Nebulization Q4H    enoxaparin  40 mg Subcutaneous Daily    fluoxetine  10 mg Oral Daily    fluticasone-vilanterol  1 puff Inhalation Daily    furosemide  80 mg Intravenous BID    potassium chloride 10%  60 mEq Oral Daily    predniSONE  20 mg Oral Daily    sodium chloride 0.9%  3 mL Intravenous Q8H    tiotropium  1 capsule Inhalation Daily     PRN Meds:    Review of patient's allergies indicates:  No Known Allergies  Objective:     Vital Signs (Most Recent):  Temp: 97.8 °F (36.6 °C) (07/19/17 1200)  Pulse: 110 (07/19/17 1230)  Resp: (!) 22 (07/19/17 1230)  BP: 116/62 (07/19/17 1200)  SpO2: (!) 92 % (07/19/17 1230) Vital Signs (24h Range):  Temp:  [97 °F (36.1 °C)-98.1 °F (36.7 °C)] 97.8 °F (36.6 °C)  Pulse:  [] 110  Resp:  [16-22] 22  SpO2:  [90 %-100 %] 92 %  BP: (116-144)/(62-74) 116/62     Weight: 65.9 kg (145 lb 4.5 oz)  Body mass index is 28.37 kg/m².      Intake/Output Summary (Last 24 hours) at 07/19/17 1234  Last data filed at 07/19/17 1110   Gross per 24 hour   Intake             1760 ml   Output             1925 ml   Net             -165 ml       Hemodynamic Parameters:       Telemetry: no events    Physical Exam   Constitutional: NAD, conversant  HEENT: Sclera anicteric, PERRLA, EOMI  Neck: Unable to visualize JVD, no carotid bruits  CV: Regular rhythm, Tachycardic, no murmur, S1/S2 with loud P2 component, No Pericardial rub  Pulm: decreased air entry  GI: Abdomen soft, NTND, +BS  Extremities: 1+ LE edema, warm and well perfused, No cyanosis, No clubbing  Skin: No ecchymosis, erythema, or ulcers  Psych: AOx3, appropriate affect  Neuro: CNII-XII intact, no focal deficits    Significant Labs:  CBC:    Recent Labs  Lab 07/19/17  0441   WBC 13.16*   RBC 3.94*   HGB 10.1*    HCT 34.7*      MCV 88   MCH 25.6*   MCHC 29.1*     BNP:  No results for input(s): BNP in the last 72 hours.    Invalid input(s): BNPPRATIBHA  CMP:    Recent Labs  Lab 07/19/17  0441 07/19/17  1139   GLU 72 152*   CALCIUM 8.8 8.8   ALBUMIN 2.5*  --    PROT 5.8*  --     138   K 2.9* 3.9   CO2 37* 39*   CL 88* 90*   BUN 23* 18   CREATININE 0.5 0.7   ALKPHOS 65  --    ALT 16  --    AST 14  --    BILITOT 0.5  --       Coagulation:   No results for input(s): INR, APTT in the last 72 hours.    Invalid input(s): PT  LDH:  No results for input(s): LDH in the last 72 hours.  Microbiology:  Microbiology Results (last 7 days)     Procedure Component Value Units Date/Time    Blood culture [[id number]] Collected:  07/18/17 0911    Order Status:  Completed Specimen:  Blood Updated:  07/19/17 1212     Blood Culture, Routine No Growth to date     Blood Culture, Routine No Growth to date    Blood culture [[id number]] Collected:  07/18/17 0911    Order Status:  Completed Specimen:  Blood Updated:  07/19/17 1212     Blood Culture, Routine No Growth to date     Blood Culture, Routine No Growth to date    Blood culture [[id number]] Collected:  07/15/17 0330    Order Status:  Completed Specimen:  Blood from Peripheral, Antecubital, Right Updated:  07/19/17 0844     Blood Culture, Routine Gram stain aer bottle: Gram positive cocci in clusters resembling Staph      Blood Culture, Routine Results called to and read back by: Damaris Malave RN 07/18/2017  08:24     Blood Culture, Routine --     MICROCOCCUS SPECIES  Organism is a probable contaminant      Blood culture [[id number]] Collected:  07/15/17 0345    Order Status:  Completed Specimen:  Blood from Peripheral, Antecubital, Left Updated:  07/19/17 0812     Blood Culture, Routine No Growth to date     Blood Culture, Routine No Growth to date     Blood Culture, Routine No Growth to date     Blood Culture, Routine No Growth to date     Blood Culture, Routine No Growth to date     Urine culture [[id number]] Collected:  07/18/17 0958    Order Status:  Sent Specimen:  Urine from Urine, Clean Catch Updated:  07/18/17 1231          I have reviewed all pertinent labs within the past 24 hours.    Estimated Creatinine Clearance: 73.4 mL/min (based on Cr of 0.7).    Diagnostic Results:  I have reviewed and interpreted all pertinent imaging results/findings within the past 24 hours.

## 2017-07-19 NOTE — PLAN OF CARE
Problem: Patient Care Overview  Goal: Plan of Care Review   07/19/17 2876   Coping/Psychosocial   Plan Of Care Reviewed With patient;spouse     Patient resting in bed. Alert and oriented x 4, calm, pleasant and cooperative. Oxygen maintained at 5 lpm/ high flow NC with humidification. Oxygen saturations ranging between 90- 96%. Patient completed spirometry with tracing this afternoon, patient tolerated procedure well.  Skin kept clean and dry. Sat up in chair for most part of this shift. Assisted to use BSC with minimal assist.  Sinus tachycardia on telemetry monitoring (low 100's). K level 2.9, given scheduled KCl 60 mEq p.o. Mg 1.9, replaced with Mg SO4 2 gms IV. Repeat labs showed improvement with K level at 3.9 and Mg level at 2.8.  Patient kept rested and comfortable. Safety maintained at all times.

## 2017-07-19 NOTE — PLAN OF CARE
Problem: Physical Therapy Goal  Goal: Physical Therapy Goal  Goals to be met by: 17     Patient will increase functional independence with mobility by performin. Supine to sit with Modified [place]-not met  2. Sit to supine with Modified [place]- not met  3. Sit to stand transfer with Supervision- not met  4. Gait  x 50 feet with Stand-by Assistance using Rolling Walker or appropriate AD. - not met  5. Lower extremity exercise program x15 reps per handout, with supervision - not met  6. Pt's  to demonstrate good understanding of (A) pt prn upon d/c - not met  7. Pt to ascend/descend 4 steps with HHA and min assist for support.- not met       Outcome: Ongoing (interventions implemented as appropriate)  Pt demonstrated progress today with increased distance amb and felipe increase activity today and activity felipe with improved O2 sats during activity.  Pt amb 18' x 1 and then 36' x 1 with seated rest break in b/t.

## 2017-07-19 NOTE — ASSESSMENT & PLAN NOTE
- WHO class pending. Likely III.   - PFTs today  - V/Q low probability for PE  - PASP 66, RAP 3 (TTE 7/15/17)  - RHC (7/18/17) - RA 10 RV 90/10, PA 80/30 (47), PWP 15, CO 4.81, CI 2.95; post NO 20PPM PA 78/30 (46); NO 40PPM PA 74/30 (45); NO 80PPM PA 78/25 (43) with CO 5.73, CI 3.52, PVR 5.2 wood units  - nebs ATC/PRN  - c/w prednisone  - Pulmonary/ID doubt pneumonia, abx stopped -- consults appreciated (Bcx 7/15 gram statin revealed micrococcus which is contaminant)  - c/w lasix 80 mg IV BID  - 2 gm NA/1500 mL fluid restric/ I/Os / Dw  - PFTs needed  - MORE pending  - Anti dsDNA, Anti SSA, SSB, AHA, CCP, Rheum factor negative, C3 75, C4 9 (low)  - c/w ICS/LABA

## 2017-07-19 NOTE — CONSULTS
Brief ID Note:  Micrococcus speciated from admission blood cx -- would agree with lab that this is a contaminant and have stopped vancomycin. Please contact me at the number below with additional questions.    Alannah Espinal MD  ID Attending  [phone]

## 2017-07-19 NOTE — NURSING
Potassium this am 2.9, magnesium 1.9.   Order received via telephone to give scheduled potassium early and magnesium 2 grams IVPB x 1. Repeat labs at 1200.

## 2017-07-19 NOTE — PLAN OF CARE
Problem: Patient Care Overview  Goal: Plan of Care Review  Outcome: Ongoing (interventions implemented as appropriate)    No acute events throughout the night. Reviewed plan of care with pt and family; all questions/concerns addressed. VS and assessment per flow sheet, patient progressing towards goals as tolerated. Will continue to monitor.

## 2017-07-19 NOTE — PT/OT/SLP PROGRESS
"Physical Therapy  Treatment    Joy Black   MRN: [id number]   Admitting Diagnosis: Pulmonary hypertension    PT Received On: 07/19/17  PT Start Time: 1239     PT Stop Time: 1319    PT Total Time (min): 40 min       Billable Minutes:  Gait [id number], Therapeutic Activity 18 and Therapeutic Exercise 10    Treatment Type: Treatment  PT/PTA: PT     PTA Visit Number: 0       General Precautions: Standard, fall (monitor O2 sats)  Orthopedic Precautions: N/A   Braces:      Do you have any cultural, spiritual, Muslim conflicts, given your current situation?: None    Subjective:  Communicated with Kristi yo, prior to session.  Pt's reports " I want to get better"    Pain/Comfort  Pain Rating 1: 0/10  Pain Rating Post-Intervention 1: 0/10    Objective:   Patient found with: peripheral IV, oxygen, telemetry    Functional Mobility:  Bed Mobility:   Rolling/Turning to Left: Supervision, With side rail (HOB at 30 degrees)  Scooting/Bridging: Supervision  Supine to Sit: Supervision, With side rail    Transfers:  Sit <> Stand Assistance: Stand By Assistance (from the bed x 1, from the bedside chair x 1)  Sit <> Stand Assistive Device: No Assistive Device    Gait:   Gait Distance: 18' for first trial,   36' x 1 for seconde trial with seated rest break in b/t  Assistance 1: Contact Guard Assistance  Gait Assistive Device: Hand held assist  Gait Pattern: 3-point gait  Gait Deviation(s): decreased shakir, increased time in double stance, decreased velocity of limb motion, decreased step length    Balance:   Static Sit: GOOD: Takes MODERATE challenges from all directions  Dynamic Sit: GOOD: Maintains balance through MODERATE excursions of active trunk movement  Static Stand: FAIR+: Takes MINIMAL challenges from all directions  Dynamic stand: FAIR: Needs CONTACT GUARD during gait     Therapeutic Activities and Exercises:  Per pt and her  her O2 sats are usually 88-90% and her O2 sats are as followed:   Pre-activity- 89 - " "90%    Post 1st gait trial- 87-88%  HR 114and recovered to 90% within 90 secs  During ex's: 86-89% -118   Resting period with cues for pursed lip breathing" 90% and   Post 2nd gait trial 80/81%    And recovered to 90% within 90 secs  Pt given ed and cues for pursed lip breathing and to "smell the flowers" and "blow out your candles" to ed on proper breathing techniques throughout activity.  PT f/u with pt's nurse on how pt felipe treatment session.  Pt instructed on and performed seated LE ex's: AP, LAQ x 15 reps each.  Seated marching x 30 reps x 2 trials.  All ex's with supervised rest breaks in b/t and ed on breathing as above.   White board up to date in pt's room.    AM-PAC 6 CLICK MOBILITY  How much help from another person does this patient currently need?   1 = Unable, Total/Dependent Assistance  2 = A lot, Maximum/Moderate Assistance  3 = A little, Minimum/Contact Guard/Supervision  4 = None, Modified Ernest/Independent    Turning over in bed (including adjusting bedclothes, sheets and blankets)?: 4  Sitting down on and standing up from a chair with arms (e.g., wheelchair, bedside commode, etc.): 3  Moving from lying on back to sitting on the side of the bed?: 4  Moving to and from a bed to a chair (including a wheelchair)?: 3  Need to walk in hospital room?: 3  Climbing 3-5 steps with a railing?: 1  Total Score: 18    AM-PAC Raw Score CMS G-Code Modifier Level of Impairment Assistance   6 % Total / Unable   7 - 9 CM 80 - 100% Maximal Assist   10 - 14 CL 60 - 80% Moderate Assist   15 - 19 CK 40 - 60% Moderate Assist   20 - 22 CJ 20 - 40% Minimal Assist   23 CI 1-20% SBA / CGA   24 CH 0% Independent/ Mod I     Patient left up in chair with all lines intact, call button in reach, nsg, Kristi, notified and B LE's elevated..    Assessment:  Joy Black is a 61 y.o. female with a medical diagnosis of Pulmonary hypertension and presents with progress towards goals today with " increased distance amb, increased activity tolerance and improved cardiopulmonary response to activity.  Pt will cont to benefit from PT to address the deficits listed below and to maximize her functional mobility and (I). Pt should not need an AD during gait at d/c.  Pt continues to be highly motivated to progress with PT.    Rehab identified problem list/impairments: Rehab identified problem list/impairments: impaired endurance, impaired functional mobilty, gait instability, impaired balance, impaired cardiopulmonary response to activity    Rehab potential is good.    Activity tolerance: Fair    Discharge recommendations: Discharge Facility/Level Of Care Needs: home health PT     Barriers to discharge: Barriers to Discharge: Inaccessible home environment, Decreased caregiver support    Equipment recommendations: Equipment Needed After Discharge: bedside commode, bath bench (pt will not need RW)     GOALS:    Physical Therapy Goals        Problem: Physical Therapy Goal    Goal Priority Disciplines Outcome Goal Variances Interventions   Physical Therapy Goal     PT/OT, PT Ongoing (interventions implemented as appropriate)     Description:  Goals to be met by: 17     Patient will increase functional independence with mobility by performin. Supine to sit with Modified [place]-not met  2. Sit to supine with Modified [place]- not met  3. Sit to stand transfer with Supervision- not met  4. Gait  x 50 feet with Stand-by Assistance using Rolling Walker or appropriate AD. - not met  5. Lower extremity exercise program x15 reps per handout, with supervision - not met  6. Pt's  to demonstrate good understanding of (A) pt prn upon d/c - not met  7. Pt to ascend/descend 4 steps with HHA and min assist for support.- not met                         PLAN:    Patient to be seen 4 x/week  to address the above listed problems via gait training, therapeutic activities, therapeutic exercises  Plan of Care  expires: 08/17/17  Plan of Care reviewed with: patient, spouse         Jaci HAWKINS Edwar, PT  07/19/2017

## 2017-07-20 LAB
ALBUMIN SERPL BCP-MCNC: 2.6 G/DL
ALP SERPL-CCNC: 63 U/L
ALT SERPL W/O P-5'-P-CCNC: 18 U/L
ANION GAP SERPL CALC-SCNC: 10 MMOL/L
ANISOCYTOSIS BLD QL SMEAR: SLIGHT
AST SERPL-CCNC: 14 U/L
BACTERIA BLD CULT: NORMAL
BACTERIA UR CULT: NORMAL
BASOPHILS # BLD AUTO: ABNORMAL K/UL
BASOPHILS NFR BLD: 0 %
BILIRUB SERPL-MCNC: 0.4 MG/DL
BUN SERPL-MCNC: 21 MG/DL
CALCIUM SERPL-MCNC: 8.7 MG/DL
CHLORIDE SERPL-SCNC: 90 MMOL/L
CO2 SERPL-SCNC: 40 MMOL/L
CREAT SERPL-MCNC: 0.5 MG/DL
DIFFERENTIAL METHOD: ABNORMAL
EOSINOPHIL # BLD AUTO: ABNORMAL K/UL
EOSINOPHIL NFR BLD: 1 %
ERYTHROCYTE [DISTWIDTH] IN BLOOD BY AUTOMATED COUNT: 16.4 %
EST. GFR  (AFRICAN AMERICAN): >60 ML/MIN/1.73 M^2
EST. GFR  (NON AFRICAN AMERICAN): >60 ML/MIN/1.73 M^2
GLUCOSE SERPL-MCNC: 83 MG/DL
HCT VFR BLD AUTO: 34.6 %
HGB BLD-MCNC: 10 G/DL
LYMPHOCYTES # BLD AUTO: ABNORMAL K/UL
LYMPHOCYTES NFR BLD: 1 %
MAGNESIUM SERPL-MCNC: 2.5 MG/DL
MCH RBC QN AUTO: 25.6 PG
MCHC RBC AUTO-ENTMCNC: 28.9 G/DL
MCV RBC AUTO: 89 FL
METAMYELOCYTES NFR BLD MANUAL: 1 %
MONOCYTES # BLD AUTO: ABNORMAL K/UL
MONOCYTES NFR BLD: 3 %
NEUTROPHILS NFR BLD: 91 %
NEUTS BAND NFR BLD MANUAL: 3 %
OVALOCYTES BLD QL SMEAR: ABNORMAL
PHOSPHATE SERPL-MCNC: 3.5 MG/DL
PLATELET # BLD AUTO: 258 K/UL
PMV BLD AUTO: 10.8 FL
POIKILOCYTOSIS BLD QL SMEAR: SLIGHT
POLYCHROMASIA BLD QL SMEAR: ABNORMAL
POTASSIUM SERPL-SCNC: 3.2 MMOL/L
PROT SERPL-MCNC: 5.8 G/DL
RBC # BLD AUTO: 3.91 M/UL
SODIUM SERPL-SCNC: 140 MMOL/L
WBC # BLD AUTO: 11.59 K/UL

## 2017-07-20 PROCEDURE — 94640 AIRWAY INHALATION TREATMENT: CPT

## 2017-07-20 PROCEDURE — 27000221 HC OXYGEN, UP TO 24 HOURS

## 2017-07-20 PROCEDURE — 84100 ASSAY OF PHOSPHORUS: CPT

## 2017-07-20 PROCEDURE — 63600175 PHARM REV CODE 636 W HCPCS: Performed by: INTERNAL MEDICINE

## 2017-07-20 PROCEDURE — 94660 CPAP INITIATION&MGMT: CPT

## 2017-07-20 PROCEDURE — 85007 BL SMEAR W/DIFF WBC COUNT: CPT

## 2017-07-20 PROCEDURE — 36415 COLL VENOUS BLD VENIPUNCTURE: CPT

## 2017-07-20 PROCEDURE — 94761 N-INVAS EAR/PLS OXIMETRY MLT: CPT

## 2017-07-20 PROCEDURE — 20600001 HC STEP DOWN PRIVATE ROOM

## 2017-07-20 PROCEDURE — 25000003 PHARM REV CODE 250: Performed by: STUDENT IN AN ORGANIZED HEALTH CARE EDUCATION/TRAINING PROGRAM

## 2017-07-20 PROCEDURE — 97116 GAIT TRAINING THERAPY: CPT

## 2017-07-20 PROCEDURE — 85027 COMPLETE CBC AUTOMATED: CPT

## 2017-07-20 PROCEDURE — 99232 SBSQ HOSP IP/OBS MODERATE 35: CPT | Mod: ,,, | Performed by: INTERNAL MEDICINE

## 2017-07-20 PROCEDURE — 97110 THERAPEUTIC EXERCISES: CPT

## 2017-07-20 PROCEDURE — 99900035 HC TECH TIME PER 15 MIN (STAT)

## 2017-07-20 PROCEDURE — 83735 ASSAY OF MAGNESIUM: CPT

## 2017-07-20 PROCEDURE — 80053 COMPREHEN METABOLIC PANEL: CPT

## 2017-07-20 PROCEDURE — 63600175 PHARM REV CODE 636 W HCPCS: Performed by: STUDENT IN AN ORGANIZED HEALTH CARE EDUCATION/TRAINING PROGRAM

## 2017-07-20 PROCEDURE — 25000242 PHARM REV CODE 250 ALT 637 W/ HCPCS: Performed by: STUDENT IN AN ORGANIZED HEALTH CARE EDUCATION/TRAINING PROGRAM

## 2017-07-20 RX ORDER — FUROSEMIDE 80 MG/1
80 TABLET ORAL 2 TIMES DAILY
Status: DISCONTINUED | OUTPATIENT
Start: 2017-07-20 | End: 2017-07-22 | Stop reason: HOSPADM

## 2017-07-20 RX ADMIN — ALBUTEROL SULFATE 2.5 MG: 2.5 SOLUTION RESPIRATORY (INHALATION) at 03:07

## 2017-07-20 RX ADMIN — TIOTROPIUM BROMIDE 18 MCG: 18 CAPSULE ORAL; RESPIRATORY (INHALATION) at 08:07

## 2017-07-20 RX ADMIN — ALBUTEROL SULFATE 2.5 MG: 2.5 SOLUTION RESPIRATORY (INHALATION) at 12:07

## 2017-07-20 RX ADMIN — FLUOXETINE 10 MG: 10 CAPSULE ORAL at 08:07

## 2017-07-20 RX ADMIN — FUROSEMIDE 80 MG: 10 INJECTION, SOLUTION INTRAVENOUS at 08:07

## 2017-07-20 RX ADMIN — Medication 3 ML: at 03:07

## 2017-07-20 RX ADMIN — Medication 3 ML: at 10:07

## 2017-07-20 RX ADMIN — FUROSEMIDE 80 MG: 80 TABLET ORAL at 05:07

## 2017-07-20 RX ADMIN — ALBUTEROL SULFATE 2.5 MG: 2.5 SOLUTION RESPIRATORY (INHALATION) at 04:07

## 2017-07-20 RX ADMIN — ALBUTEROL SULFATE 2.5 MG: 2.5 SOLUTION RESPIRATORY (INHALATION) at 11:07

## 2017-07-20 RX ADMIN — ENOXAPARIN SODIUM 40 MG: 100 INJECTION SUBCUTANEOUS at 05:07

## 2017-07-20 RX ADMIN — FLUTICASONE FUROATE AND VILANTEROL TRIFENATATE 1 PUFF: 100; 25 POWDER RESPIRATORY (INHALATION) at 08:07

## 2017-07-20 RX ADMIN — PREDNISONE 20 MG: 20 TABLET ORAL at 08:07

## 2017-07-20 RX ADMIN — ALBUTEROL SULFATE 2.5 MG: 2.5 SOLUTION RESPIRATORY (INHALATION) at 07:07

## 2017-07-20 RX ADMIN — POTASSIUM CHLORIDE 60 MEQ: 20 SOLUTION ORAL at 08:07

## 2017-07-20 NOTE — PLAN OF CARE
Problem: Physical Therapy Goal  Goal: Physical Therapy Goal  Goals to be met by: 17     Patient will increase functional independence with mobility by performin. Supine to sit with Modified [place]-not met  2. Sit to supine with Modified [place]- not met  3. Sit to stand transfer with Supervision- not met  4. Gait  x 50 feet with Stand-by Assistance using Rolling Walker or appropriate AD. - not met  5. Lower extremity exercise program x15 reps per handout, with supervision - not met  6. Pt's  to demonstrate good understanding of (A) pt prn upon d/c - not met  7. Pt to ascend/descend 4 steps with HHA and min assist for support.- not met        Outcome: Ongoing (interventions implemented as appropriate)  Pt continues to slowly improve activity felipe and activity tolerated with PT.  Pt is only requiring CGA for gait and is able to mobilize in her room with nursing. Will cont to benefit from PT to increase her balance and (I) with gait and to increase her functional activity tolerance.  Goals remain appropriate.

## 2017-07-20 NOTE — SUBJECTIVE & OBJECTIVE
Interval History: NAEON. Underwent PFTs yesterday which revealed severe obstruction with possible restriction.    Continuous Infusions:   Scheduled Meds:   albuterol sulfate  2.5 mg Nebulization Q4H    enoxaparin  40 mg Subcutaneous Daily    fluoxetine  10 mg Oral Daily    fluticasone-vilanterol  1 puff Inhalation Daily    furosemide  80 mg Oral BID    potassium chloride 10%  60 mEq Oral Daily    predniSONE  20 mg Oral Daily    sodium chloride 0.9%  3 mL Intravenous Q8H    tiotropium  1 capsule Inhalation Daily     PRN Meds:    Review of patient's allergies indicates:  No Known Allergies  Objective:     Vital Signs (Most Recent):  Temp: 98 °F (36.7 °C) (07/20/17 0836)  Pulse: (!) 114 (07/20/17 1300)  Resp: 18 (07/20/17 1300)  BP: 131/67 (07/20/17 1300)  SpO2: (!) 93 % (07/20/17 1300) Vital Signs (24h Range):  Temp:  [97.8 °F (36.6 °C)-98.7 °F (37.1 °C)] 98 °F (36.7 °C)  Pulse:  [] 114  Resp:  [16-24] 18  SpO2:  [91 %-99 %] 93 %  BP: (118-150)/(63-81) 131/67     Weight: 66 kg (145 lb 8.1 oz)  Body mass index is 28.42 kg/m².      Intake/Output Summary (Last 24 hours) at 07/20/17 1511  Last data filed at 07/20/17 1021   Gross per 24 hour   Intake              455 ml   Output              900 ml   Net             -445 ml       Hemodynamic Parameters:       Telemetry: no events    Physical Exam   Constitutional: NAD, conversant  HEENT: Sclera anicteric, PERRLA, EOMI  Neck: Unable to visualize JVD, no carotid bruits  CV: Regular rhythm, Tachycardic, no murmur, S1/S2 with loud P2 component, No Pericardial rub  Pulm: decreased air entry  GI: Abdomen soft, NTND, +BS  Extremities: 1+ LE edema, warm and well perfused, No cyanosis, No clubbing  Skin: No ecchymosis, erythema, or ulcers  Psych: AOx3, appropriate affect  Neuro: CNII-XII intact, no focal deficits       Significant Labs:  CBC:    Recent Labs  Lab 07/20/17  0432   WBC 11.59   RBC 3.91*   HGB 10.0*   HCT 34.6*      MCV 89   MCH 25.6*   MCHC 28.9*      BNP:  No results for input(s): BNP in the last 72 hours.    Invalid input(s): BNPTRIAGELBLO  CMP:    Recent Labs  Lab 07/20/17  0432   GLU 83   CALCIUM 8.7   ALBUMIN 2.6*   PROT 5.8*      K 3.2*   CO2 40*   CL 90*   BUN 21*   CREATININE 0.5   ALKPHOS 63   ALT 18   AST 14   BILITOT 0.4      Coagulation:   No results for input(s): INR, APTT in the last 72 hours.    Invalid input(s): PT  LDH:  No results for input(s): LDH in the last 72 hours.  Microbiology:  Microbiology Results (last 7 days)     Procedure Component Value Units Date/Time    Blood culture [[id number]] Collected:  07/18/17 0911    Order Status:  Completed Specimen:  Blood Updated:  07/20/17 1212     Blood Culture, Routine No Growth to date     Blood Culture, Routine No Growth to date     Blood Culture, Routine No Growth to date    Blood culture [[id number]] Collected:  07/18/17 0911    Order Status:  Completed Specimen:  Blood Updated:  07/20/17 1212     Blood Culture, Routine No Growth to date     Blood Culture, Routine No Growth to date     Blood Culture, Routine No Growth to date    Urine culture [[id number]] Collected:  07/18/17 0958    Order Status:  Completed Specimen:  Urine from Urine, Clean Catch Updated:  07/20/17 1112     Urine Culture, Routine --     COAGULASE-NEGATIVE STAPHYLOCOCCUS SPECIES  10,000 - 49,999 cfu/ml  Susceptibility testing not routinely performed.      Blood culture [[id number]] Collected:  07/15/17 0330    Order Status:  Completed Specimen:  Blood from Peripheral, Antecubital, Right Updated:  07/20/17 0915     Blood Culture, Routine Gram stain aer bottle: Gram positive cocci in clusters resembling Staph      Blood Culture, Routine Results called to and read back by: Damaris Malave RN 07/18/2017  08:24     Blood Culture, Routine --     MICROCOCCUS SPECIES  Organism is a probable contaminant      Blood culture [[id number]] Collected:  07/15/17 0345    Order Status:  Completed Specimen:  Blood from Peripheral, Antecubital,  Left Updated:  07/20/17 0812     Blood Culture, Routine No growth after 5 days.          I have reviewed all pertinent labs within the past 24 hours.    Estimated Creatinine Clearance: 102.7 mL/min (based on Cr of 0.5).    Diagnostic Results:  I have reviewed and interpreted all pertinent imaging results/findings within the past 24 hours.

## 2017-07-20 NOTE — PROGRESS NOTES
[facility]  Heart Transplant  Progress Note    Patient Name: Joy Black  MRN: [id number]  Admission Date: 7/14/2017  Hospital Length of Stay: 6 days  Attending Physician: Erich Medley Jr.,*  Primary Care Provider: Jim Hernandez MD  Principal Problem:Pulmonary hypertension    Subjective:     Interval History: NAEON. Underwent PFTs yesterday which revealed severe obstruction with possible restriction.    Continuous Infusions:   Scheduled Meds:   albuterol sulfate  2.5 mg Nebulization Q4H    enoxaparin  40 mg Subcutaneous Daily    fluoxetine  10 mg Oral Daily    fluticasone-vilanterol  1 puff Inhalation Daily    furosemide  80 mg Oral BID    potassium chloride 10%  60 mEq Oral Daily    predniSONE  20 mg Oral Daily    sodium chloride 0.9%  3 mL Intravenous Q8H    tiotropium  1 capsule Inhalation Daily     PRN Meds:    Review of patient's allergies indicates:  No Known Allergies  Objective:     Vital Signs (Most Recent):  Temp: 98 °F (36.7 °C) (07/20/17 0836)  Pulse: (!) 114 (07/20/17 1300)  Resp: 18 (07/20/17 1300)  BP: 131/67 (07/20/17 1300)  SpO2: (!) 93 % (07/20/17 1300) Vital Signs (24h Range):  Temp:  [97.8 °F (36.6 °C)-98.7 °F (37.1 °C)] 98 °F (36.7 °C)  Pulse:  [] 114  Resp:  [16-24] 18  SpO2:  [91 %-99 %] 93 %  BP: (118-150)/(63-81) 131/67     Weight: 66 kg (145 lb 8.1 oz)  Body mass index is 28.42 kg/m².      Intake/Output Summary (Last 24 hours) at 07/20/17 1511  Last data filed at 07/20/17 1021   Gross per 24 hour   Intake              455 ml   Output              900 ml   Net             -445 ml       Hemodynamic Parameters:       Telemetry: no events    Physical Exam   Constitutional: NAD, conversant  HEENT: Sclera anicteric, PERRLA, EOMI  Neck: Unable to visualize JVD, no carotid bruits  CV: Regular rhythm, Tachycardic, no murmur, S1/S2 with loud P2 component, No Pericardial rub  Pulm: decreased air entry  GI: Abdomen soft, NTND, +BS  Extremities: 1+ LE edema,  warm and well perfused, No cyanosis, No clubbing  Skin: No ecchymosis, erythema, or ulcers  Psych: AOx3, appropriate affect  Neuro: CNII-XII intact, no focal deficits       Significant Labs:  CBC:    Recent Labs  Lab 07/20/17 0432   WBC 11.59   RBC 3.91*   HGB 10.0*   HCT 34.6*      MCV 89   MCH 25.6*   MCHC 28.9*     BNP:  No results for input(s): BNP in the last 72 hours.    Invalid input(s): BNPTRIAGELBLO  CMP:    Recent Labs  Lab 07/20/17 0432   GLU 83   CALCIUM 8.7   ALBUMIN 2.6*   PROT 5.8*      K 3.2*   CO2 40*   CL 90*   BUN 21*   CREATININE 0.5   ALKPHOS 63   ALT 18   AST 14   BILITOT 0.4      Coagulation:   No results for input(s): INR, APTT in the last 72 hours.    Invalid input(s): PT  LDH:  No results for input(s): LDH in the last 72 hours.  Microbiology:  Microbiology Results (last 7 days)     Procedure Component Value Units Date/Time    Blood culture [[id number]] Collected:  07/18/17 0911    Order Status:  Completed Specimen:  Blood Updated:  07/20/17 1212     Blood Culture, Routine No Growth to date     Blood Culture, Routine No Growth to date     Blood Culture, Routine No Growth to date    Blood culture [[id number]] Collected:  07/18/17 0911    Order Status:  Completed Specimen:  Blood Updated:  07/20/17 1212     Blood Culture, Routine No Growth to date     Blood Culture, Routine No Growth to date     Blood Culture, Routine No Growth to date    Urine culture [[id number]] Collected:  07/18/17 0958    Order Status:  Completed Specimen:  Urine from Urine, Clean Catch Updated:  07/20/17 1112     Urine Culture, Routine --     COAGULASE-NEGATIVE STAPHYLOCOCCUS SPECIES  10,000 - 49,999 cfu/ml  Susceptibility testing not routinely performed.      Blood culture [[id number]] Collected:  07/15/17 0330    Order Status:  Completed Specimen:  Blood from Peripheral, Antecubital, Right Updated:  07/20/17 0915     Blood Culture, Routine Gram stain aer bottle: Gram positive cocci in clusters resembling  Staph      Blood Culture, Routine Results called to and read back by: Damaris Malave RN 07/18/2017  08:24     Blood Culture, Routine --     MICROCOCCUS SPECIES  Organism is a probable contaminant      Blood culture [[id number]] Collected:  07/15/17 0345    Order Status:  Completed Specimen:  Blood from Peripheral, Antecubital, Left Updated:  07/20/17 0812     Blood Culture, Routine No growth after 5 days.          I have reviewed all pertinent labs within the past 24 hours.    Estimated Creatinine Clearance: 102.7 mL/min (based on Cr of 0.5).    Diagnostic Results:  I have reviewed and interpreted all pertinent imaging results/findings within the past 24 hours.    Assessment and Plan:     * Pulmonary hypertension WHO III    - PFTs severe obstuction - consistent with COPD  - V/Q low probability for PE  - PASP 66, RAP 3 (TTE 7/15/17)  - RHC (7/18/17) - RA 10 RV 90/10, PA 80/30 (47), PWP 15, CO 4.81, CI 2.95; post NO 20PPM PA 78/30 (46); NO 40PPM PA 74/30 (45); NO 80PPM PA 78/25 (43) with CO 5.73, CI 3.52, PVR 5.2 wood units  - nebs ATC/PRN  - c/w prednisone  - Pulmonary/ID doubt pneumonia, abx stopped -- consults appreciated (Bcx 7/15 gram statin revealed micrococcus which is contaminant)  - changing to PO lasix 80 BID  - 2 gm NA/1500 mL fluid restric/ I/Os / Dw  - PFTs needed  - MORE pending  - Anti dsDNA, Anti SSA, SSB, AHA, CCP, Rheum factor negative, C3 75, C4 9 (low)  - c/w ICS/LABA  - NO indication at this point for PH medications        Cor pulmonale    - change to lasix 80 mg PO BID  - I/Os, daily weights  - O2 PRN  - PT/OT        Acute on chronic respiratory failure    - O2 PRN  - due to RHF/COPD        Centrilobular emphysema    - c/w nebs  - c/w prednisone  - c/w albuterol/tiotropium/LABA/ICS  - pulm input appreciated          Case discussed with attending. D/c home tomorrow. Spoke with  to arrange for Home O2 if necessary. Will need concentrator up to 6L.    Florina De La Torre MD  Heart  Transplant  [facility]

## 2017-07-20 NOTE — PLAN OF CARE
Problem: Patient Care Overview  Goal: Plan of Care Review  Outcome: Ongoing (interventions implemented as appropriate)  Plan of care discussed with patient, no new questions at this time. VSS, denies SOB, no complaint of pain. No events overnight. Safety precautions taken, no falls/trauma during the shift. Will continue to monitor.

## 2017-07-20 NOTE — PROGRESS NOTES
Pt very anxious about going home and having everything she needs there, specifically Cpap.  Dr De La Torre came in and spoke in great detail about what they need to do when they go home, follow up with pt pulmonologist get sleep study done, address prednisone with  and also f/u with cardiologist.

## 2017-07-20 NOTE — PT/OT/SLP PROGRESS
"Physical Therapy  Treatment    Joy Black   MRN: [id number]   Admitting Diagnosis: Pulmonary hypertension    PT Received On: 07/20/17  PT Start Time: 1016     PT Stop Time: 1048    PT Total Time (min): 32 min       Billable Minutes:  Gait [id number] and Therapeutic Exercise 19    Treatment Type: Treatment  PT/PTA: PT     PTA Visit Number: 0       General Precautions: Standard, fall (monitor O2 sats)  Orthopedic Precautions: N/A   Braces:      Do you have any cultural, spiritual, Mandaen conflicts, given your current situation?: None    Subjective:  Communicated with nsgLexis, prior to session.  Also communicated during session to notify her of pt's 50cc output after using commode.   " I know I just need to relax and breathe"    Pain/Comfort  Pain Rating 1: 0/10  Pain Rating Post-Intervention 1: 0/10 (reports B thighs feel sore)    Objective:   Patient found with: oxygen, peripheral IV, telemetry    Functional Mobility:  Bed Mobility:   Rolling/Turning to Left: Supervision, With side rail  Scooting/Bridging: Supervision  Supine to Sit: Supervision, With side rail    Transfers:  Sit <> Stand Assistance: Stand By Assistance  Sit <> Stand Assistive Device: No Assistive Device  Bed <> Chair Technique: Stand Pivot  Bed <> Chair Assistance: Stand By Assistance  Toilet Transfer Technique: Stand Pivot (to [facility])  Toilet Transfer Assistance: Stand By Assistance    Gait:   Gait Distance: 20' for 1st trial and 20' for 2nd trial with seated rest break in b/t.  Assistance 1: Contact Guard Assistance  Gait Assistive Device: Hand held assist  Gait Pattern: 3-point gait  Gait Deviation(s): decreased shakir, increased time in double stance, decreased velocity of limb motion, decreased step length    Balance:   Static Sit: GOOD: Takes MODERATE challenges from all directions  Dynamic Sit: GOOD: Maintains balance through MODERATE excursions of active trunk movement  Static Stand: FAIR: Maintains without assist but unable to take " challenges  Dynamic stand: FAIR: Needs CONTACT GUARD during gait     Therapeutic Activities and Exercises:  Pt instructed on and performed seated LE ex's for B LE's x 15 reps each: AP, LAQ.  Seated marching in place x 30 reps. Rest break required b/t ex's.   O2 sats monitored throughout session:  Rest:  91%  And   After t/f to BSC and then the chair: 91% and   After 1st gait trial:  84%  And  ( returned to 91% within 2 min with proper breathing)  During ex's:  90-92%  And -118  After 2nd gait trial:  86%  And  ( returned to 90% within 90 sec)  PT ed pt on cont POC, pursed lip breathing and proper breathing techniques, along with distraction techniques to decrease anxiety.      AM-PAC 6 CLICK MOBILITY  How much help from another person does this patient currently need?   1 = Unable, Total/Dependent Assistance  2 = A lot, Maximum/Moderate Assistance  3 = A little, Minimum/Contact Guard/Supervision  4 = None, Modified [place]/Independent    Turning over in bed (including adjusting bedclothes, sheets and blankets)?: 4  Sitting down on and standing up from a chair with arms (e.g., wheelchair, bedside commode, etc.): 3  Moving from lying on back to sitting on the side of the bed?: 4  Moving to and from a bed to a chair (including a wheelchair)?: 3  Need to walk in hospital room?: 3  Climbing 3-5 steps with a railing?: 1  Total Score: 18    AM-PAC Raw Score CMS G-Code Modifier Level of Impairment Assistance   6 % Total / Unable   7 - 9 CM 80 - 100% Maximal Assist   10 - 14 CL 60 - 80% Moderate Assist   15 - 19 CK 40 - 60% Moderate Assist   20 - 22 CJ 20 - 40% Minimal Assist   23 CI 1-20% SBA / CGA   24 CH 0% Independent/ Mod I     Patient left up in chair with all lines intact, call button in reach and Lexis yo notified.    Assessment:  Joy Black is a 61 y.o. female with a medical diagnosis of Pulmonary hypertension and presents with continued slow progress towards goals  with improved activity tolerance and better O2 saturation levels during activity.   Pt will cont to benefit from PT to maximize her functional (I) and safety with mobility and to decrease her fall risk.  Pt does appear to exhibit some anxiety about becoming SOB but it does not limit her participation with PT.  Pt remains highly motivated for PT and should cont to progress well.    Rehab identified problem list/impairments: Rehab identified problem list/impairments: impaired endurance, impaired functional mobilty, gait instability, impaired balance, impaired cardiopulmonary response to activity    Rehab potential is good.    Activity tolerance: Fair    Discharge recommendations: Discharge Facility/Level Of Care Needs: home health PT     Barriers to discharge: Barriers to Discharge: Inaccessible home environment, Decreased caregiver support    Equipment recommendations: Equipment Needed After Discharge: bedside commode, bath bench     GOALS:    Physical Therapy Goals        Problem: Physical Therapy Goal    Goal Priority Disciplines Outcome Goal Variances Interventions   Physical Therapy Goal     PT/OT, PT Ongoing (interventions implemented as appropriate)     Description:  Goals to be met by: 17     Patient will increase functional independence with mobility by performin. Supine to sit with Modified [place]-not met  2. Sit to supine with Modified [place]- not met  3. Sit to stand transfer with Supervision- not met  4. Gait  x 50 feet with Stand-by Assistance using Rolling Walker or appropriate AD. - not met  5. Lower extremity exercise program x15 reps per handout, with supervision - not met  6. Pt's  to demonstrate good understanding of (A) pt prn upon d/c - not met  7. Pt to ascend/descend 4 steps with HHA and min assist for support.- not met                         PLAN:    Patient to be seen 4 x/week  to address the above listed problems via gait training, therapeutic activities,  therapeutic exercises  Plan of Care expires: 08/17/17  Plan of Care reviewed with: patient         Jaci HAWKINS Edwar, PT  07/20/2017

## 2017-07-21 PROBLEM — F32.A DEPRESSION: Status: ACTIVE | Noted: 2017-07-21

## 2017-07-21 PROBLEM — F05 DELIRIUM DUE TO ANOTHER MEDICAL CONDITION, ACUTE, HYPOACTIVE: Status: ACTIVE | Noted: 2017-07-21

## 2017-07-21 LAB
ALBUMIN SERPL BCP-MCNC: 2.9 G/DL
ALLENS TEST: ABNORMAL
ALLENS TEST: ABNORMAL
ALP SERPL-CCNC: 72 U/L
ALT SERPL W/O P-5'-P-CCNC: 28 U/L
AMMONIA PLAS-SCNC: 66 UMOL/L
ANION GAP SERPL CALC-SCNC: 13 MMOL/L
ANISOCYTOSIS BLD QL SMEAR: SLIGHT
AST SERPL-CCNC: 21 U/L
BASOPHILS # BLD AUTO: ABNORMAL K/UL
BASOPHILS NFR BLD: 0 %
BILIRUB SERPL-MCNC: 0.5 MG/DL
BILIRUB UR QL STRIP: NEGATIVE
BNP SERPL-MCNC: 110 PG/ML
BUN SERPL-MCNC: 22 MG/DL
CALCIUM SERPL-MCNC: 9.4 MG/DL
CHLORIDE SERPL-SCNC: 92 MMOL/L
CLARITY UR REFRACT.AUTO: ABNORMAL
CO2 SERPL-SCNC: 35 MMOL/L
COLOR UR AUTO: YELLOW
CREAT SERPL-MCNC: 0.6 MG/DL
DELSYS: ABNORMAL
DELSYS: ABNORMAL
DIFFERENTIAL METHOD: ABNORMAL
EOSINOPHIL # BLD AUTO: ABNORMAL K/UL
EOSINOPHIL NFR BLD: 0 %
ERYTHROCYTE [DISTWIDTH] IN BLOOD BY AUTOMATED COUNT: 16.6 %
EST. GFR  (AFRICAN AMERICAN): >60 ML/MIN/1.73 M^2
EST. GFR  (NON AFRICAN AMERICAN): >60 ML/MIN/1.73 M^2
FOLATE SERPL-MCNC: 13 NG/ML
GLUCOSE SERPL-MCNC: 86 MG/DL
GLUCOSE UR QL STRIP: NEGATIVE
HCO3 UR-SCNC: 38.8 MMOL/L (ref 24–28)
HCO3 UR-SCNC: 43.8 MMOL/L (ref 24–28)
HCT VFR BLD AUTO: 35.9 %
HGB BLD-MCNC: 10.6 G/DL
HGB UR QL STRIP: NEGATIVE
HYPOCHROMIA BLD QL SMEAR: ABNORMAL
KETONES UR QL STRIP: NEGATIVE
LEUKOCYTE ESTERASE UR QL STRIP: NEGATIVE
LYMPHOCYTES # BLD AUTO: ABNORMAL K/UL
LYMPHOCYTES NFR BLD: 12 %
MAGNESIUM SERPL-MCNC: 2.2 MG/DL
MAGNESIUM SERPL-MCNC: 2.3 MG/DL
MCH RBC QN AUTO: 25.8 PG
MCHC RBC AUTO-ENTMCNC: 29.5 G/DL
MCV RBC AUTO: 87 FL
METAMYELOCYTES NFR BLD MANUAL: 7 %
MONOCYTES # BLD AUTO: ABNORMAL K/UL
MONOCYTES NFR BLD: 5 %
NEUTROPHILS NFR BLD: 76 %
NITRITE UR QL STRIP: NEGATIVE
PCO2 BLDA: 57.2 MMHG (ref 35–45)
PCO2 BLDA: 58.1 MMHG (ref 35–45)
PH SMN: 7.44 [PH] (ref 7.35–7.45)
PH SMN: 7.49 [PH] (ref 7.35–7.45)
PH UR STRIP: 6 [PH] (ref 5–8)
PHOSPHATE SERPL-MCNC: 3.6 MG/DL
PLATELET # BLD AUTO: 279 K/UL
PLATELET BLD QL SMEAR: ABNORMAL
PMV BLD AUTO: 10.1 FL
PO2 BLDA: 231 MMHG (ref 80–100)
PO2 BLDA: 33 MMHG (ref 80–100)
POC BE: 15 MMOL/L
POC BE: 20 MMOL/L
POC SATURATED O2: 100 % (ref 95–100)
POC SATURATED O2: 63 % (ref 95–100)
POC TCO2: 41 MMOL/L (ref 23–27)
POC TCO2: 46 MMOL/L (ref 23–27)
POTASSIUM SERPL-SCNC: 3.4 MMOL/L
PROT SERPL-MCNC: 6.3 G/DL
PROT UR QL STRIP: NEGATIVE
RBC # BLD AUTO: 4.11 M/UL
SAMPLE: ABNORMAL
SAMPLE: ABNORMAL
SITE: ABNORMAL
SITE: ABNORMAL
SODIUM SERPL-SCNC: 140 MMOL/L
SP GR UR STRIP: 1.02 (ref 1–1.03)
TSH SERPL DL<=0.005 MIU/L-ACNC: 3.67 UIU/ML
URN SPEC COLLECT METH UR: ABNORMAL
UROBILINOGEN UR STRIP-ACNC: NEGATIVE EU/DL
VIT B12 SERPL-MCNC: 288 PG/ML
WBC # BLD AUTO: 13.5 K/UL

## 2017-07-21 PROCEDURE — 84443 ASSAY THYROID STIM HORMONE: CPT

## 2017-07-21 PROCEDURE — 87086 URINE CULTURE/COLONY COUNT: CPT

## 2017-07-21 PROCEDURE — 83735 ASSAY OF MAGNESIUM: CPT | Mod: 91

## 2017-07-21 PROCEDURE — 82803 BLOOD GASES ANY COMBINATION: CPT

## 2017-07-21 PROCEDURE — 80053 COMPREHEN METABOLIC PANEL: CPT

## 2017-07-21 PROCEDURE — 83880 ASSAY OF NATRIURETIC PEPTIDE: CPT

## 2017-07-21 PROCEDURE — 84100 ASSAY OF PHOSPHORUS: CPT

## 2017-07-21 PROCEDURE — 25000003 PHARM REV CODE 250: Performed by: STUDENT IN AN ORGANIZED HEALTH CARE EDUCATION/TRAINING PROGRAM

## 2017-07-21 PROCEDURE — 82607 VITAMIN B-12: CPT

## 2017-07-21 PROCEDURE — 20600001 HC STEP DOWN PRIVATE ROOM

## 2017-07-21 PROCEDURE — 36600 WITHDRAWAL OF ARTERIAL BLOOD: CPT

## 2017-07-21 PROCEDURE — 25000242 PHARM REV CODE 250 ALT 637 W/ HCPCS: Performed by: STUDENT IN AN ORGANIZED HEALTH CARE EDUCATION/TRAINING PROGRAM

## 2017-07-21 PROCEDURE — 63600175 PHARM REV CODE 636 W HCPCS: Performed by: STUDENT IN AN ORGANIZED HEALTH CARE EDUCATION/TRAINING PROGRAM

## 2017-07-21 PROCEDURE — 87088 URINE BACTERIA CULTURE: CPT

## 2017-07-21 PROCEDURE — 63600175 PHARM REV CODE 636 W HCPCS: Performed by: INTERNAL MEDICINE

## 2017-07-21 PROCEDURE — 99900035 HC TECH TIME PER 15 MIN (STAT)

## 2017-07-21 PROCEDURE — 36415 COLL VENOUS BLD VENIPUNCTURE: CPT

## 2017-07-21 PROCEDURE — 94660 CPAP INITIATION&MGMT: CPT

## 2017-07-21 PROCEDURE — 82140 ASSAY OF AMMONIA: CPT

## 2017-07-21 PROCEDURE — 81003 URINALYSIS AUTO W/O SCOPE: CPT

## 2017-07-21 PROCEDURE — 99232 SBSQ HOSP IP/OBS MODERATE 35: CPT | Mod: ,,, | Performed by: INTERNAL MEDICINE

## 2017-07-21 PROCEDURE — 87077 CULTURE AEROBIC IDENTIFY: CPT

## 2017-07-21 PROCEDURE — 85007 BL SMEAR W/DIFF WBC COUNT: CPT

## 2017-07-21 PROCEDURE — 90792 PSYCH DIAG EVAL W/MED SRVCS: CPT | Mod: GC,ICN,, | Performed by: PSYCHIATRY & NEUROLOGY

## 2017-07-21 PROCEDURE — 87186 SC STD MICRODIL/AGAR DIL: CPT

## 2017-07-21 PROCEDURE — 94640 AIRWAY INHALATION TREATMENT: CPT

## 2017-07-21 PROCEDURE — 85027 COMPLETE CBC AUTOMATED: CPT

## 2017-07-21 PROCEDURE — 87040 BLOOD CULTURE FOR BACTERIA: CPT

## 2017-07-21 PROCEDURE — 82746 ASSAY OF FOLIC ACID SERUM: CPT

## 2017-07-21 PROCEDURE — 83735 ASSAY OF MAGNESIUM: CPT

## 2017-07-21 PROCEDURE — 27100171 HC OXYGEN HIGH FLOW UP TO 24 HOURS

## 2017-07-21 RX ORDER — OLANZAPINE 5 MG/1
5 TABLET ORAL DAILY PRN
Status: DISCONTINUED | OUTPATIENT
Start: 2017-07-21 | End: 2017-07-21

## 2017-07-21 RX ORDER — OLANZAPINE 5 MG/1
5 TABLET ORAL EVERY 6 HOURS PRN
Status: DISCONTINUED | OUTPATIENT
Start: 2017-07-21 | End: 2017-07-22 | Stop reason: HOSPADM

## 2017-07-21 RX ORDER — FLUOXETINE 10 MG/1
10 CAPSULE ORAL ONCE
Status: DISCONTINUED | OUTPATIENT
Start: 2017-07-21 | End: 2017-07-22 | Stop reason: HOSPADM

## 2017-07-21 RX ORDER — FLUOXETINE HYDROCHLORIDE 20 MG/1
20 CAPSULE ORAL DAILY
Status: DISCONTINUED | OUTPATIENT
Start: 2017-07-22 | End: 2017-07-22 | Stop reason: HOSPADM

## 2017-07-21 RX ADMIN — ALBUTEROL SULFATE 2.5 MG: 2.5 SOLUTION RESPIRATORY (INHALATION) at 07:07

## 2017-07-21 RX ADMIN — ALBUTEROL SULFATE 2.5 MG: 2.5 SOLUTION RESPIRATORY (INHALATION) at 11:07

## 2017-07-21 RX ADMIN — FUROSEMIDE 80 MG: 80 TABLET ORAL at 09:07

## 2017-07-21 RX ADMIN — TIOTROPIUM BROMIDE 18 MCG: 18 CAPSULE ORAL; RESPIRATORY (INHALATION) at 11:07

## 2017-07-21 RX ADMIN — ALBUTEROL SULFATE 2.5 MG: 2.5 SOLUTION RESPIRATORY (INHALATION) at 12:07

## 2017-07-21 RX ADMIN — POTASSIUM CHLORIDE 60 MEQ: 20 SOLUTION ORAL at 08:07

## 2017-07-21 RX ADMIN — Medication 3 ML: at 09:07

## 2017-07-21 RX ADMIN — FUROSEMIDE 80 MG: 80 TABLET ORAL at 08:07

## 2017-07-21 RX ADMIN — OLANZAPINE 5 MG: 5 TABLET, FILM COATED ORAL at 09:07

## 2017-07-21 RX ADMIN — PREDNISONE 20 MG: 20 TABLET ORAL at 08:07

## 2017-07-21 RX ADMIN — ALBUTEROL SULFATE 2.5 MG: 2.5 SOLUTION RESPIRATORY (INHALATION) at 04:07

## 2017-07-21 RX ADMIN — Medication 3 ML: at 03:07

## 2017-07-21 RX ADMIN — FLUTICASONE FUROATE AND VILANTEROL TRIFENATATE 1 PUFF: 100; 25 POWDER RESPIRATORY (INHALATION) at 08:07

## 2017-07-21 RX ADMIN — ENOXAPARIN SODIUM 40 MG: 100 INJECTION SUBCUTANEOUS at 09:07

## 2017-07-21 RX ADMIN — FLUOXETINE 10 MG: 10 CAPSULE ORAL at 11:07

## 2017-07-21 NOTE — PROGRESS NOTES
"Notified (MD) Dr. Starks of pt status. Pt pulled off her Bipap, performing inappropriate gestures. Pt stated " I'm confused, I dont know my name, I don't know where I am. And that something is mentally wrong with me" Pt also stated she wanted to harm herself. Dr. Starks is coming to the bedside. Nurse stayed at bedside.   "

## 2017-07-21 NOTE — ASSESSMENT & PLAN NOTE
-Appreciate psych recs  -likely delirium due to chronic disease, new underlying organic problem  -denies SI/HI at this point; no plan for suicide  -does not meet inpatient criteria and does not need to be PEC'd.  -increase to Prozac 20mg daily  -start Zyprexa 2.5 mg PO/IM q6hr PRN agitation  -continue to manage medical condition and assess/treat pain  -recommend 1:1 sitter at bedside for agitation and to monitor patient as she is a fall risk and still trying to get out of bed  -please avoid/minimize use of benzos, anticholinergics, antihistaminics (H1 and H2 blockers), opiates when possible as they may worsen or exacerbate delirium  -please keep shades open and lights on during day, shades closed and lights off at night to encourage normal sleep/wake cycle. Encourage family to be present as much as possible and staff to re-orient patient throughout the day as needed. Replaced glasses/hearing aids if patient wears these devices regularly.  -please try to minimize the use of physical restraints as they can worsen agitation; utilize chemical restraints first.  -c/w PT/OT  -recommend assessment for delirium every 4 hours while awake with CAM-ICU  -continue to look for etiology of AMS: r/o autoimmune causes, infectious causes (consider/repeat blood and urine cx, HIV, RPR) obtain CXR, TSH, b12/folate levels, ammonia level. Recommend head imaging (CT/MRI brain), consider EEG to r/o status epilepticus or rule out possible sedative/etoh withdrawal if patient has a history of EtOH abuse

## 2017-07-21 NOTE — ASSESSMENT & PLAN NOTE
Patient initially endorsed SI without a plan; however, spoke with  who thinks confusion occurs after patient receives BIPAP overnight. Patient seems is disoriented to month and date and does not follow commands to participate in CAM-ICU. Patient does not appear to be a danger to herself, nor do we think she could effectively act out any plan. Patient does not meet inpatient criteria and does not need to be PEC'd.    Recommendations:  -Increase to Prozac 20mg daily  -Start Zyprexa 2.5 mg PO/IM q6hr PRN agitation    -continue to manage medical condition and assess/treat pain  -recommend 1:1 sitter at bedside for agitation and to monitor patient as she is a fall risk and still trying to get out of bed  -please avoid/minimize use of benzos, anticholinergics, antihistaminics (H1 and H2 blockers), opiates when possible as they may worsen or exacerbate delirium  -please keep shades open and lights on during day, shades closed and lights off at night to encourage normal sleep/wake cycle. Encourage family to be present as much as possible and staff to re-orient patient throughout the day as needed. Replaced glasses/hearing aids if patient wears these devices regularly.  -please try to minimize the use of physical restraints as they can worsen agitation; utilize chemical restraints first.  -recommend consult to PT/OT. Early mobility and exercise has been shown to decrease duration of delirium  -recommend assessment for delirium every 4 hours while awake with CAM-ICU    -continue to look for etiology of AMS: r/o autoimmune causes, infectious causes (consider/repeat blood and urine cx, HIV, RPR) obtain CXR, TSH, b12/folate levels, ammonia level. Recommend head imaging (CT/MRI brain), consider EEG to r/o status epilepticus or rule out possible sedative/etoh withdrawal if patient has a history of EtOH abuse.    Appreciate the consult. Will sign off. Please re-consult with any further questions.

## 2017-07-21 NOTE — PROGRESS NOTES
DISCHARGE    Per multidisciplinary rounds this morning, pt was PEC'd overnight due to delirium with suicidal and homicidal ideations.  Per report from Dr. De La Torre this afternoon, PEC has been cleared by psychiatry and HTS team plans to complete testing recommended by psych.  Per Dr. De La Torre, pt may be ready for d/c this weekend.    TONI spoke with pt's  Roberto by phone ([phone]) today re: possible weekend discharge.   states that he does not think pt will be going home over the weekend due to issues she had overnight.   states he has not yet talked to anyone to find out the name of the HH company he would like to use for pt, due to issues pt had overnight and this morning.   reports that he will transport pt home when she is ready to d/c, and he has portable O2 in his truck for pt to use on drive home.   denies any needs or concerns to SW at this time.    TONI spoke with Braxton re: pt's home oxygen equipment.  Braxton reports pt's home concentrator can handle a 6-10L flow.  TONI discussed with Dr. De La Torre, who reports it is okay for pt to be on 6L at home (pt is currently on 5L in hospital).  TONI spoke with Janee with [facility] ([id number]) who confirms that DME orders were received ([facility]SC & [facility]).  Janee will contact  re: delivery and payment.  SW will f/u with  on Monday to get name of HH agency and fax referral.  SW providing ongoing psychosocial and counseling support, education, resources, assistance, and discharge planning as indicated.  SW following and remains available.

## 2017-07-21 NOTE — PLAN OF CARE
Problem: Patient Care Overview  Goal: Plan of Care Review  Outcome: Ongoing (interventions implemented as appropriate)  Plan of care discussed with patient, no new questions at this time. VSS, denies SOB, no complaint of pain. No events overnight. Plan for discharge in am. Safety precautions taken, no falls/trauma during the shift. Will continue to monitor.

## 2017-07-21 NOTE — SUBJECTIVE & OBJECTIVE
Patient History           Medical as of 7/21/2017     Past Medical History Date Comments Source    Arthritis -- -- Provider    Cancer -- Cervical cancer 1980s - remission Provider    COPD (chronic obstructive pulmonary disease) -- 3.5 L home O2 (for 8 years) Provider    Emphysema, unspecified -- -- Provider    Emphysema/COPD -- -- Provider            Surgical as of 7/21/2017     Past Surgical History Laterality Date Comments Source    TONSILLECTOMY -- -- -- Provider            Family as of 7/21/2017    **None**           Tobacco Use as of 7/21/2017     Smoking Status Smoking Start Date Smoking Quit Date Packs/day Years Used    Former Smoker -- -- -- --    Types Comments Smokeless Tobacco Status Smokeless Tobacco Quit Date Source     Cigarettes Started at 20years old. Quit 15 years ago Never Used -- Provider            Alcohol Use as of 7/21/2017     Alcohol Use Drinks/Week Alcohol/Week Comments Source    -- -- -- -- Provider            Drug Use as of 7/21/2017     Drug Use Types Frequency Comments Source    No -- -- -- Provider            Sexual Activity as of 7/21/2017     Sexually Active Birth Control Partners Comments Source    -- -- -- -- Provider            Activities of Daily Living as of 7/21/2017    **None**           Social Documentation as of 7/21/2017    **None**           Occupational as of 7/21/2017    **None**           Socioeconomic as of 7/21/2017     Marital Status Spouse Name Number of Children Years Education Preferred Language Ethnicity Race Source     -- -- -- English Unknown Unknown --         Additional Pertinent History Q A Comments    as of 7/21/2017 Place in Birth Order      Number of Siblings      Raised by      Childhood Trauma      Financial Status      Highest Level of Education      Lives with      Lives in      Criminal History of      Legal Involvement      Does patient have access to a firearm?       Service      Primary Leisure Activity      Spirituality       Caffeine Use         Review of patient's allergies indicates:  No Known Allergies    No current facility-administered medications on file prior to encounter.      No current outpatient prescriptions on file prior to encounter.     Psychotherapeutics     Start     Stop Route Frequency Ordered    07/15/17 0900  fluoxetine capsule 10 mg      -- Oral Daily 07/15/17 0246        Review of Systems   Unable to perform ROS: Mental status change     Objective:     Vital Signs (Most Recent):  Temp: 98.1 °F (36.7 °C) (07/21/17 0800)  Pulse: 100 (07/21/17 1000)  Resp: 18 (07/21/17 0800)  BP: 124/63 (07/21/17 0800)  SpO2: (!) 92 % (07/21/17 0800) Vital Signs (24h Range):  Temp:  [96.8 °F (36 °C)-98.1 °F (36.7 °C)] 98.1 °F (36.7 °C)  Pulse:  [] 100  Resp:  [17-21] 18  SpO2:  [92 %-96 %] 92 %  BP: (124-153)/(63-80) 124/63     Height: 5' (152.4 cm)  Weight: 65.6 kg (144 lb 11.7 oz)  Body mass index is 28.27 kg/m².      Intake/Output Summary (Last 24 hours) at 07/21/17 1034  Last data filed at 07/21/17 0458   Gross per 24 hour   Intake              180 ml   Output              500 ml   Net             -320 ml       Physical Exam   Psychiatric:   Mental Status Exam:  Appearance: age appropriate, disheveled, overweight, sitting on the edge of the bed  Behavior/Cooperation: limited cooperative  Speech: appropriate rate, volume and tone normal tone, normal rate, normal pitch, normal volume  Language: uses words appropriately; NO aphasia or dysarthria  Mood: depressed  Affect:  congruent with mood  Thought Process: illogical, perseverative  Thought Content: initially endorsed SI without a plan but then outright denied SI  Level of Consciousness: Alert and Oriented x3  Memory:  Intact   3/3 immediate, 0/3 at 5 minutes   Attention/concentration: appropriate for age/education.   Fund of Knowledge: appears adequate  Insight:  Impaired  Judgment: Impaired            Significant Labs:   Last 24 Hours:   Recent Lab Results        07/21/17  0522      Albumin 2.9(L)     Alkaline Phosphatase 72     ALT 28     Anion Gap 13     Aniso Slight     AST 21     Baso # CANCELED  Comment:  Result canceled by the ancillary     Basophil% 0.0     Total Bilirubin 0.5  Comment:  For infants and newborns, interpretation of results should be based  on gestational age, weight and in agreement with clinical  observations.  Premature Infant recommended reference ranges:  Up to 24 hours.............<8.0 mg/dL  Up to 48 hours............<12.0 mg/dL  3-5 days..................<15.0 mg/dL  6-29 days.................<15.0 mg/dL       BUN, Bld 22(H)     Calcium 9.4     Chloride 92(L)     CO2 35(H)     Creatinine 0.6     Differential Method Manual     eGFR if African American >60.0     eGFR if non  >60.0  Comment:  Calculation used to obtain the estimated glomerular filtration  rate (eGFR) is the CKD-EPI equation. Since race is unknown   in our information system, the eGFR values for   -American and Non--American patients are given   for each creatinine result.       Eos # CANCELED  Comment:  Result canceled by the ancillary     Eosinophil% 0.0     Glucose 86     Gran% 76.0(H)     Hematocrit 35.9(L)     Hemoglobin 10.6(L)     Hypo Occasional     Lymph # CANCELED  Comment:  Result canceled by the ancillary     Lymph% 12.0(L)     Magnesium 2.3     MCH 25.8(L)     MCHC 29.5(L)     MCV 87     Metamyelocytes 7.0     Mono # CANCELED  Comment:  Result canceled by the ancillary     Mono% 5.0     MPV 10.1     Phosphorus 3.6     Platelet Estimate Appears normal     Platelets 279     Potassium 3.4(L)     Total Protein 6.3     RBC 4.11     RDW 16.6(H)     Sodium 140     WBC 13.50(H)           Significant Imaging: I have reviewed all pertinent imaging results/findings within the past 24 hours.

## 2017-07-21 NOTE — HOSPITAL COURSE
7/14 Patient transferred to [facility] due to pulmonary HTN after being treated for PNA at OSH.  7/21 Psychiatry consulted for SI. Recommend treatment of delirium. Will sign off.
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
## 2017-07-21 NOTE — PLAN OF CARE
[facility]   [facility]  [address] [place] [address]   [phone] after hours        HOME  HEALTH ORDERS      Admit to Home Health    Diagnosis:   Patient Active Problem List   Diagnosis    Pulmonary hypertension    Centrilobular emphysema    Cor pulmonale    Acute on chronic respiratory failure    CHF (congestive heart failure)    Delirium due to another medical condition, acute, hypoactive       Patient is homebound due to:  COPD on home O2 (5LNC), PH WHO III, Right heart failure    Diet: 2 gram sodium diet    Acitivities: as tolerated    Nursing:   SN to complete comprehensive assessment including routine vital signs. Instruct on disease process and s/s of complications to report to MD. Review/verify medication list sent home with the patient at time of discharge  and instruct patient/caregiver as needed. Frequency may be adjusted depending on start of care date.    Notify MD if SBP > 160 or < 90; DBP > 90 or < 50; HR > 120 or < 50; Temp > 101; Weight gain >3lbs in 1 day or 5lbs in 1 week. Notify MD of O2 saturations < 85% on 5LNC.      LABS:  SN to perform labs: CBC, CMP, Mg, BNP every 2 weeks      CONSULTS:      Physical Therapy to evaluate and treat. Evaluate for home safety and equipment needs; Establish/upgrade home exercise program. Perform / instruct on therapeutic exercises, gait training, transfer training, and Range of Motion.    Occupational Therapy to evaluate and treat. Evaluate home environment for safety and equipment needs. Perform/Instruct on transfers, ADL training, ROM, and therapeutic exercises.    Send initial Home Health orders to [facility] attending physician on call.  Send follow up questions to [phone] or fax:                            Heart Failure:      [phone]
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
## 2017-07-21 NOTE — PROGRESS NOTES
"Upon pt escort bringing pt back to floor, CT scan tech called RN to report that pt was sitting in waiting area for over an hour with no O2 in tank. Tech stated "There' no telling how long she's been sitting down here with an empty tank". Once pt came to floor, vital signs taken, O2 sats were at 74%, and pt complaining of SOB. Notified Dr. Teixeira with [facility], ABG's stat ordered. SOS-ed pt escort on matter and notified charge nurse, Sofia. Will continue to monitor.     "

## 2017-07-21 NOTE — PROGRESS NOTES
D/C PLANNING UPDATE    SW attempted to see pt x2 on 7/20 to discuss d/c planning.  On both visits, pt reported she was feeling confused and requested that SW speak with her  when he arrives to room.  SW spoke with  in the afternoon.  Per multidisciplinary rounds this morning, pt may be ready for d/c tomorrow (Friday).   reports that he has portable O2 for pt at home that he will bring in his truck when pt is ready to d/c.   reports he thinks pt will need HH after d/c.   states he knows a few people who work for HH and he will ask them for agency name(s).   also asking about DME for pt at home.  PT note from today recommends [facility].  SW will request orders for HH and DME.   reports he will transport pt home at time of d/c.   denies any other needs or concerns to SW at this time.  SW provided contact number to .  SW providing ongoing psychosocial and counseling support, education, resources, assistance, and discharge planning as indicated.  SW following and remains available.
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
## 2017-07-21 NOTE — PLAN OF CARE
Problem: Patient Care Overview  Goal: Plan of Care Review  Outcome: Ongoing (interventions implemented as appropriate)  Pt free of falls/trauma/injuries this shift. Pt ambulating with assistance with fall precautions in place. Pt stated she's been very confused, and night RN reported pt was speaking inappropriately and stating she wanted to kill herself.  came to bedside, and CEC'd patient, sitter continuously at bedside. Plan of care reviewed with patient and pt's  at bedside, no new questions at this time. VSS. Supplemental O2 at 5L High Flow Nasal Cannula, pt satting at 92%. Pt voices no complaints of chest pain, SOB, or dizziness. No acute distress noted. Will continue to monitor.

## 2017-07-22 ENCOUNTER — TELEPHONE (OUTPATIENT)
Dept: TRANSPLANT | Facility: HOSPITAL | Age: 62
End: 2017-07-22

## 2017-07-22 VITALS
HEART RATE: 109 BPM | RESPIRATION RATE: 18 BRPM | OXYGEN SATURATION: 90 % | TEMPERATURE: 98 F | SYSTOLIC BLOOD PRESSURE: 101 MMHG | WEIGHT: 143.5 LBS | HEIGHT: 60 IN | DIASTOLIC BLOOD PRESSURE: 70 MMHG | BODY MASS INDEX: 28.17 KG/M2

## 2017-07-22 LAB
ALBUMIN SERPL BCP-MCNC: 2.8 G/DL
ALP SERPL-CCNC: 72 U/L
ALT SERPL W/O P-5'-P-CCNC: 32 U/L
ANION GAP SERPL CALC-SCNC: 10 MMOL/L
ANISOCYTOSIS BLD QL SMEAR: SLIGHT
AST SERPL-CCNC: 22 U/L
BASOPHILS # BLD AUTO: ABNORMAL K/UL
BASOPHILS NFR BLD: 0 %
BILIRUB SERPL-MCNC: 0.4 MG/DL
BUN SERPL-MCNC: 15 MG/DL
CALCIUM SERPL-MCNC: 8.7 MG/DL
CHLORIDE SERPL-SCNC: 93 MMOL/L
CO2 SERPL-SCNC: 37 MMOL/L
CREAT SERPL-MCNC: 0.6 MG/DL
DIFFERENTIAL METHOD: ABNORMAL
EOSINOPHIL # BLD AUTO: ABNORMAL K/UL
EOSINOPHIL NFR BLD: 0 %
ERYTHROCYTE [DISTWIDTH] IN BLOOD BY AUTOMATED COUNT: 16.8 %
EST. GFR  (AFRICAN AMERICAN): >60 ML/MIN/1.73 M^2
EST. GFR  (NON AFRICAN AMERICAN): >60 ML/MIN/1.73 M^2
GLUCOSE SERPL-MCNC: 79 MG/DL
HCT VFR BLD AUTO: 35.1 %
HGB BLD-MCNC: 10.3 G/DL
LYMPHOCYTES # BLD AUTO: ABNORMAL K/UL
LYMPHOCYTES NFR BLD: 2 %
MAGNESIUM SERPL-MCNC: 2.2 MG/DL
MCH RBC QN AUTO: 25.8 PG
MCHC RBC AUTO-ENTMCNC: 29.3 G/DL
MCV RBC AUTO: 88 FL
METAMYELOCYTES NFR BLD MANUAL: 3 %
MONOCYTES # BLD AUTO: ABNORMAL K/UL
MONOCYTES NFR BLD: 4 %
NEUTROPHILS NFR BLD: 91 %
PHOSPHATE SERPL-MCNC: 3.9 MG/DL
PLATELET # BLD AUTO: 270 K/UL
PLATELET BLD QL SMEAR: ABNORMAL
PMV BLD AUTO: 10.6 FL
POTASSIUM SERPL-SCNC: 3.1 MMOL/L
PROT SERPL-MCNC: 6 G/DL
RBC # BLD AUTO: 3.99 M/UL
SODIUM SERPL-SCNC: 140 MMOL/L
WBC # BLD AUTO: 15.41 K/UL

## 2017-07-22 PROCEDURE — 83735 ASSAY OF MAGNESIUM: CPT

## 2017-07-22 PROCEDURE — 80053 COMPREHEN METABOLIC PANEL: CPT

## 2017-07-22 PROCEDURE — 63600175 PHARM REV CODE 636 W HCPCS: Performed by: STUDENT IN AN ORGANIZED HEALTH CARE EDUCATION/TRAINING PROGRAM

## 2017-07-22 PROCEDURE — 85027 COMPLETE CBC AUTOMATED: CPT

## 2017-07-22 PROCEDURE — 25000242 PHARM REV CODE 250 ALT 637 W/ HCPCS: Performed by: STUDENT IN AN ORGANIZED HEALTH CARE EDUCATION/TRAINING PROGRAM

## 2017-07-22 PROCEDURE — 99238 HOSP IP/OBS DSCHRG MGMT 30/<: CPT | Mod: ,,, | Performed by: INTERNAL MEDICINE

## 2017-07-22 PROCEDURE — 94761 N-INVAS EAR/PLS OXIMETRY MLT: CPT

## 2017-07-22 PROCEDURE — 85007 BL SMEAR W/DIFF WBC COUNT: CPT

## 2017-07-22 PROCEDURE — 25000003 PHARM REV CODE 250: Performed by: STUDENT IN AN ORGANIZED HEALTH CARE EDUCATION/TRAINING PROGRAM

## 2017-07-22 PROCEDURE — 84100 ASSAY OF PHOSPHORUS: CPT

## 2017-07-22 PROCEDURE — 94640 AIRWAY INHALATION TREATMENT: CPT

## 2017-07-22 PROCEDURE — 36415 COLL VENOUS BLD VENIPUNCTURE: CPT

## 2017-07-22 RX ORDER — POTASSIUM CHLORIDE 20 MEQ/15ML
20 SOLUTION ORAL ONCE
Status: COMPLETED | OUTPATIENT
Start: 2017-07-22 | End: 2017-07-22

## 2017-07-22 RX ORDER — TIOTROPIUM BROMIDE 18 UG/1
1 CAPSULE ORAL; RESPIRATORY (INHALATION) DAILY
Qty: 1 CAPSULE | Refills: 5 | Status: SHIPPED | OUTPATIENT
Start: 2017-07-22 | End: 2017-07-22

## 2017-07-22 RX ORDER — POTASSIUM CHLORIDE 20 MEQ/15ML
40 SOLUTION ORAL DAILY
Qty: 600 ML | Refills: 3 | Status: SHIPPED | OUTPATIENT
Start: 2017-07-22

## 2017-07-22 RX ORDER — TIOTROPIUM BROMIDE 18 UG/1
1 CAPSULE ORAL; RESPIRATORY (INHALATION) DAILY
Qty: 1 CAPSULE | Refills: 5 | Status: SHIPPED | OUTPATIENT
Start: 2017-07-22 | End: 2018-07-22

## 2017-07-22 RX ORDER — POTASSIUM CHLORIDE 20 MEQ/15ML
40 SOLUTION ORAL DAILY
Qty: 600 ML | Refills: 3 | Status: SHIPPED | OUTPATIENT
Start: 2017-07-22 | End: 2017-07-22

## 2017-07-22 RX ORDER — ASPIRIN 81 MG/1
81 TABLET ORAL DAILY
Refills: 0 | COMMUNITY
Start: 2017-07-22 | End: 2018-07-22

## 2017-07-22 RX ORDER — ASPIRIN 81 MG/1
81 TABLET ORAL DAILY
Status: DISCONTINUED | OUTPATIENT
Start: 2017-07-22 | End: 2017-07-22 | Stop reason: HOSPADM

## 2017-07-22 RX ORDER — ASPIRIN 81 MG/1
81 TABLET ORAL DAILY
Refills: 0 | COMMUNITY
Start: 2017-07-22 | End: 2017-07-22

## 2017-07-22 RX ORDER — FLUOXETINE HYDROCHLORIDE 20 MG/1
20 CAPSULE ORAL DAILY
Qty: 30 CAPSULE | Refills: 0 | Status: SHIPPED | OUTPATIENT
Start: 2017-07-22 | End: 2018-07-22

## 2017-07-22 RX ORDER — FUROSEMIDE 80 MG/1
80 TABLET ORAL 2 TIMES DAILY
Qty: 60 TABLET | Refills: 3 | Status: SHIPPED | OUTPATIENT
Start: 2017-07-22 | End: 2017-07-22

## 2017-07-22 RX ORDER — FUROSEMIDE 80 MG/1
80 TABLET ORAL 2 TIMES DAILY
Qty: 60 TABLET | Refills: 3 | Status: SHIPPED | OUTPATIENT
Start: 2017-07-22 | End: 2018-07-22

## 2017-07-22 RX ADMIN — ASPIRIN 81 MG: 81 TABLET, COATED ORAL at 12:07

## 2017-07-22 RX ADMIN — ALBUTEROL SULFATE 2.5 MG: 2.5 SOLUTION RESPIRATORY (INHALATION) at 03:07

## 2017-07-22 RX ADMIN — FUROSEMIDE 80 MG: 80 TABLET ORAL at 09:07

## 2017-07-22 RX ADMIN — ALBUTEROL SULFATE 2.5 MG: 2.5 SOLUTION RESPIRATORY (INHALATION) at 08:07

## 2017-07-22 RX ADMIN — POTASSIUM CHLORIDE 60 MEQ: 20 SOLUTION ORAL at 09:07

## 2017-07-22 RX ADMIN — PREDNISONE 20 MG: 20 TABLET ORAL at 09:07

## 2017-07-22 RX ADMIN — TIOTROPIUM BROMIDE 18 MCG: 18 CAPSULE ORAL; RESPIRATORY (INHALATION) at 08:07

## 2017-07-22 RX ADMIN — FLUTICASONE FUROATE AND VILANTEROL TRIFENATATE 1 PUFF: 100; 25 POWDER RESPIRATORY (INHALATION) at 08:07

## 2017-07-22 RX ADMIN — POTASSIUM CHLORIDE 20 MEQ: 20 SOLUTION ORAL at 12:07

## 2017-07-22 RX ADMIN — FLUOXETINE 20 MG: 20 CAPSULE ORAL at 09:07

## 2017-07-22 NOTE — PLAN OF CARE
Problem: Patient Care Overview  Goal: Individualization & Mutuality  Outcome: Ongoing (interventions implemented as appropriate)  Pt free of falls/trauma/injuries this shift.VSS. Medication administered as perscribed. PRN zyprexa administered for sleep promotion and relieve agitation R/T being on BIPAP. Neuro check q4 performed. ABGs performed. Sitter at bedside, fall precautions maintained. Patient tolerating POC well. Pt verbalizes understanding of care. Pt denies any chest pain, SOB, or any other discomforts at this time. Will continue to monitor.

## 2017-07-22 NOTE — SUBJECTIVE & OBJECTIVE
Interval History: NAEON. Feels well and denies any major complaints. All work-up for delirium essentially negative. 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 Bcx/Ucx/UA/CXR negative. TSH/B12/folate WNL.     Back to baseline - fluoxetine increased by psych yesterday.    Continuous Infusions:   Scheduled Meds:   albuterol sulfate  2.5 mg Nebulization Q4H    aspirin  81 mg Oral Daily    enoxaparin  40 mg Subcutaneous Daily    fluoxetine  10 mg Oral Once    fluoxetine  20 mg Oral Daily    fluticasone-vilanterol  1 puff Inhalation Daily    furosemide  80 mg Oral BID    potassium chloride 10%  20 mEq Oral Once    potassium chloride 10%  60 mEq Oral Daily    predniSONE  20 mg Oral Daily    sodium chloride 0.9%  3 mL Intravenous Q8H    tiotropium  1 capsule Inhalation Daily     PRN Meds:olanzapine    Review of patient's allergies indicates:  No Known Allergies  Objective:     Vital Signs (Most Recent):  Temp: 98.1 °F (36.7 °C) (07/22/17 0800)  Pulse: 106 (07/22/17 1000)  Resp: 20 (07/22/17 0909)  BP: 120/63 (07/22/17 0800)  SpO2: 95 % (07/22/17 0805) Vital Signs (24h Range):  Temp:  [98 °F (36.7 °C)-99.9 °F (37.7 °C)] 98.1 °F (36.7 °C)  Pulse:  [] 106  Resp:  [16-22] 20  SpO2:  [84 %-97 %] 95 %  BP: (120-143)/(63-71) 120/63     Weight: 65.1 kg (143 lb 8.3 oz)  Body mass index is 28.03 kg/m².      Intake/Output Summary (Last 24 hours) at 07/22/17 1211  Last data filed at 07/22/17 0600   Gross per 24 hour   Intake              510 ml   Output              500 ml   Net               10 ml       Hemodynamic Parameters:       Telemetry: no events    Physical Exam   Constitutional: NAD, conversant  HEENT: Sclera anicteric, PERRLA, EOMI  Neck: Unable to visualize JVD, no carotid bruits  CV: Regular rhythm, Tachycardic, no murmur, S1/S2 with loud P2 component, No Pericardial rub  Pulm: decreased air entry  GI: Abdomen soft, NTND, +BS  Extremities: 1+ LE edema, warm and well perfused, No cyanosis, No  clubbing  Skin: No ecchymosis, erythema, or ulcers  Psych: AAOX2, issues with recent memory  Neuro: CNII-XII intact, no focal deficits       Significant Labs:  CBC:    Recent Labs  Lab 07/22/17  0551   WBC 15.41*   RBC 3.99*   HGB 10.3*   HCT 35.1*      MCV 88   MCH 25.8*   MCHC 29.3*     BNP:    Recent Labs  Lab 07/21/17  1034   *     CMP:    Recent Labs  Lab 07/22/17  0551   GLU 79   CALCIUM 8.7   ALBUMIN 2.8*   PROT 6.0      K 3.1*   CO2 37*   CL 93*   BUN 15   CREATININE 0.6   ALKPHOS 72   ALT 32   AST 22   BILITOT 0.4      Coagulation:   No results for input(s): INR, APTT in the last 72 hours.    Invalid input(s): PT  LDH:  No results for input(s): LDH in the last 72 hours.  Microbiology:  Microbiology Results (last 7 days)     Procedure Component Value Units Date/Time    Blood culture [[id number]] Collected:  07/21/17 1842    Order Status:  Completed Specimen:  Blood Updated:  07/22/17 0315     Blood Culture, Routine No Growth to date    Narrative:       Collection has been rescheduled by [email or web address] at 7/21/2017 16:33 Reason:   patient in ultrasound   Collection has been rescheduled by CAB2 at 7/21/2017 18:07 Reason:   follow up on pt, still in CT  Collection has been rescheduled by KM at 7/21/2017 16:33 Reason:   patient in ultrasound   Collection has been rescheduled by CAB2 at 7/21/2017 18:07 Reason:   follow up on pt, still in CT    Blood culture [[id number]] Collected:  07/21/17 1631    Order Status:  Completed Specimen:  Blood Updated:  07/22/17 0115     Blood Culture, Routine No Growth to date    Urine culture [[id number]] Collected:  07/21/17 2122    Order Status:  Sent Specimen:  Urine from Urine, Clean Catch Updated:  07/21/17 2145    Blood culture [[id number]] Collected:  07/18/17 0911    Order Status:  Completed Specimen:  Blood Updated:  07/21/17 1212     Blood Culture, Routine No Growth to date     Blood Culture, Routine No Growth to date     Blood Culture, Routine No Growth to date      Blood Culture, Routine No Growth to date    Blood culture [[id number]] Collected:  07/18/17 0911    Order Status:  Completed Specimen:  Blood Updated:  07/21/17 1212     Blood Culture, Routine No Growth to date     Blood Culture, Routine No Growth to date     Blood Culture, Routine No Growth to date     Blood Culture, Routine No Growth to date    Urine culture [[id number]] Collected:  07/18/17 0958    Order Status:  Completed Specimen:  Urine from Urine, Clean Catch Updated:  07/20/17 1112     Urine Culture, Routine --     COAGULASE-NEGATIVE STAPHYLOCOCCUS SPECIES  10,000 - 49,999 cfu/ml  Susceptibility testing not routinely performed.      Blood culture [[id number]] Collected:  07/15/17 0330    Order Status:  Completed Specimen:  Blood from Peripheral, Antecubital, Right Updated:  07/20/17 0915     Blood Culture, Routine Gram stain aer bottle: Gram positive cocci in clusters resembling Staph      Blood Culture, Routine Results called to and read back by: Damaris Malave RN 07/18/2017  08:24     Blood Culture, Routine --     MICROCOCCUS SPECIES  Organism is a probable contaminant      Blood culture [[id number]] Collected:  07/15/17 0345    Order Status:  Completed Specimen:  Blood from Peripheral, Antecubital, Left Updated:  07/20/17 0812     Blood Culture, Routine No growth after 5 days.          I have reviewed all pertinent labs within the past 24 hours.    Estimated Creatinine Clearance: 84.9 mL/min (based on Cr of 0.6).    Diagnostic Results:  I have reviewed and interpreted all pertinent imaging results/findings within the past 24 hours.

## 2017-07-22 NOTE — PROGRESS NOTES
"On Call  Discharge Note:    TONI paged by Zaida Boss RN to address questions pt's  has regarding pt's discharge as she is discharging today. TONI contacted pt's  via hosptial room phone. Pt's  asked several medical and medication questions, which SW redirected to medical team. TONI advised pt's  that all medical questions should be directed to MD or RN.     Pt's  had questions around if pf had the correct home concentrator for pt's O2 requirements. Pt stated pt has 5L concentrator and is worried what will happen if pt needs 6L of oxygen while at home. Pt's  wondering if 5L concentrator will work with 6L of oxygen. TONI advised pt that if pt needs 6L of oxygen he will need a 6-10L concetrator. TONI checked with RN, who stated that pt has been on 5L of oxygen and is not currently requiring 6L. TONI relayed this information to pt's . Pt's  expressed worry that pt may need 6L in the future. TONI agreed to contact [facility] and clarify which equipment pt has. Pt's  also had questions about receiving the "large nose cannulas". RN agreed to provide. Pt's  requested information on how to swap out pt's old nebulizer for a new one.  stated that he ordered it from a place in [facility] and requested instructions on how to pick it up. SW advised pt's  that he would have to consult with the agency he ordered it from to retrieve instrutions for . Pt's  expressed understanding.     TONI contacted [facility] at [phone] to obtain information around pt's concentrator. Lorelei at [facility] informed TONI that pt is listed as having a 6L-10L concentrator. TONI requested phone number for pt's  to call with any questions. Lorelei provided phone number [phone].    Pt needs home health per medical record. Pt requested to use [facility]- [phone]. TONI made referral. TONI spoke to on call RN, April Stearns who stated they do not take " [place] managed Medicare, which is listed as pt's primary insurance.     SW contacted pt's  to provide information regarding concentrator, [employer] phone number, and to give opportunity to request a different home health agency. Pt's  insisted that pt has 5L concentrator, not 6L-10L concentrator. SW advised pt's  to contact [employer] at the number above to clarify and retrieve the correct equipment. Pt's  expressed understanding and agreed to do so. Pt also stated that pt's primary insurance is [facility] and [employer] managed Medicare is secondary.  requested that SW try to make another referral using [facility] as priamry. SW agreed to do so.     TONI contacted April at [facility] again and she stated that they do accept [facility]. TONI informed April that pt's  reported [facility] to be primary. April agreed to get pt set up for discharge today. She does have access to [facility] and will retrieve orders and referral documentation herself.     TONI contacted pt's  again to inform him that HH has been set up. Pt and pt's  expressed understanding and are in agreement with all discharge plans. SW remains available.

## 2017-07-22 NOTE — DISCHARGE SUMMARY
Discharge Summary  Heart Transplant Service      Admit Date: 7/14/2017    Discharge Date:  7/22/2017    Attending Physician: Erich Medley Jr.,*    Discharge Physician: Florina De La Torre MD    Principal Diagnoses: Pulmonary hypertension  Indication for Admission: ADHF due to severe PH due to COPD    Discharged Condition: Good    Hospital Course:   61 y.o. woman with PMH of COPD on home O2 (12 years), DM, remote cervical Ca, HTN, smoker (1-2 PPD x 32 years) who is a transfer from [facility] for evaluation of severe PH with right heart failure. Over the course of her stay she was diuresed with lasix gtt and then transitioned to IVP then ultimately 80 mg PO BID. She underwent PH work-up which included VQ which was negative, PFTs which revealed severe obstruction, RHC with elevated PAPs and normal wedge. She was deemed to be WHO group III and PH medications were not indicated in her case. Advised to continue taking her COPD medications (prednisone,LABA/ICS, albuterol PRN) and diuretics.   Close to her discharge date, she developed delirium which resolved. Psychiatry did not feel she needed to be PEC'd and the patient denied all SI/HI once her AMS resolved. She felt well and agreed to go home.    Outpatient Plan:  - continue to take all medications prescribed  - F/U with your PCP, pulmonologist and cardiolgist within 1-2 weeks  - c/w 5L/min O2 at home  - F/u with psychiatrist for re-fill on psychotropic medications    Diet: 2 gram sodium diet    Activity: Ad mike    Disposition: Home or Self Care     Discharge Medications:      Medication List      START taking these medications    aspirin 81 MG EC tablet  Commonly known as:  ECOTRIN  Take 1 tablet (81 mg total) by mouth once daily.     potassium chloride 10% 20 mEq/15 mL solution  Commonly known as:  KAYCIEL  Take 30 mLs (40 mEq total) by mouth once daily.        CHANGE how you take these medications    fluoxetine 20 MG capsule  Commonly  known as:  PROZAC  Take 1 capsule (20 mg total) by mouth once daily.  What changed:  · medication strength  · how much to take     furosemide 80 MG tablet  Commonly known as:  LASIX  Take 1 tablet (80 mg total) by mouth 2 (two) times daily.  What changed:  · medication strength  · how much to take        CONTINUE taking these medications    fluticasone-salmeterol 250-50 mcg/dose 250-50 mcg/dose diskus inhaler  Commonly known as:  ADVAIR     predniSONE 20 MG tablet  Commonly known as:  DELTASONE     tiotropium 18 mcg inhalation capsule  Commonly known as:  SPIRIVA     * VENTOLIN HFA 90 mcg/actuation inhaler  Generic drug:  albuterol     * albuterol 2.5 mg /3 mL (0.083 %) nebulizer solution  Commonly known as:  PROVENTIL        * This list has 2 medication(s) that are the same as other medications prescribed for you. Read the directions carefully, and ask your doctor or other care provider to review them with you.               Where to Get Your Medications      These medications were sent to [address] [address] [address] [facility]  [address] [facility] [address]    Phone:  [phone]   · fluoxetine 20 MG capsule  · furosemide 80 MG tablet  · potassium chloride 10% 20 mEq/15 mL solution     You can get these medications from any pharmacy    You don't need a prescription for these medications  · aspirin 81 MG EC tablet       Follow Up:  Follow-up Information     Jim Hernandez MD In 1 week.    Specialty:  Family Medicine  Why:  post- hospital discharge for acute RHF due to COPD/severe PH WHOIII. Arrange follow up with pulmonologist and cardiologist  Contact information:  [address] [place] [address] [phone]                 Case discussed with attending.    Florina De La Torre MD  Cardiology Fellow  Pager: [phone]  7/22/2017 12:19 PM

## 2017-07-22 NOTE — PROGRESS NOTES
[facility]  Heart Transplant  Progress Note    Patient Name: Joy Black  MRN: [id number]  Admission Date: 7/14/2017  Hospital Length of Stay: 8 days  Attending Physician: Erich Medley Jr.,*  Primary Care Provider: Jim Hernandez MD  Principal Problem:Pulmonary hypertension    Subjective:     Interval History: NAEON. Feels well and denies any major complaints. All work-up for delirium essentially negative. 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 Bcx/Ucx/UA/CXR negative. TSH/B12/folate WNL.     Back to baseline - fluoxetine increased by psych yesterday.    Continuous Infusions:   Scheduled Meds:   albuterol sulfate  2.5 mg Nebulization Q4H    aspirin  81 mg Oral Daily    enoxaparin  40 mg Subcutaneous Daily    fluoxetine  10 mg Oral Once    fluoxetine  20 mg Oral Daily    fluticasone-vilanterol  1 puff Inhalation Daily    furosemide  80 mg Oral BID    potassium chloride 10%  20 mEq Oral Once    potassium chloride 10%  60 mEq Oral Daily    predniSONE  20 mg Oral Daily    sodium chloride 0.9%  3 mL Intravenous Q8H    tiotropium  1 capsule Inhalation Daily     PRN Meds:olanzapine    Review of patient's allergies indicates:  No Known Allergies  Objective:     Vital Signs (Most Recent):  Temp: 98.1 °F (36.7 °C) (07/22/17 0800)  Pulse: 106 (07/22/17 1000)  Resp: 20 (07/22/17 0909)  BP: 120/63 (07/22/17 0800)  SpO2: 95 % (07/22/17 0805) Vital Signs (24h Range):  Temp:  [98 °F (36.7 °C)-99.9 °F (37.7 °C)] 98.1 °F (36.7 °C)  Pulse:  [] 106  Resp:  [16-22] 20  SpO2:  [84 %-97 %] 95 %  BP: (120-143)/(63-71) 120/63     Weight: 65.1 kg (143 lb 8.3 oz)  Body mass index is 28.03 kg/m².      Intake/Output Summary (Last 24 hours) at 07/22/17 1211  Last data filed at 07/22/17 0600   Gross per 24 hour   Intake              510 ml   Output              500 ml   Net               10 ml       Hemodynamic Parameters:       Telemetry: no events    Physical Exam   Constitutional: NAD,  conversant  HEENT: Sclera anicteric, PERRLA, EOMI  Neck: Unable to visualize JVD, no carotid bruits  CV: Regular rhythm, Tachycardic, no murmur, S1/S2 with loud P2 component, No Pericardial rub  Pulm: decreased air entry  GI: Abdomen soft, NTND, +BS  Extremities: 1+ LE edema, warm and well perfused, No cyanosis, No clubbing  Skin: No ecchymosis, erythema, or ulcers  Psych: AAOX2, issues with recent memory  Neuro: CNII-XII intact, no focal deficits       Significant Labs:  CBC:    Recent Labs  Lab 07/22/17  0551   WBC 15.41*   RBC 3.99*   HGB 10.3*   HCT 35.1*      MCV 88   MCH 25.8*   MCHC 29.3*     BNP:    Recent Labs  Lab 07/21/17  1034   *     CMP:    Recent Labs  Lab 07/22/17  0551   GLU 79   CALCIUM 8.7   ALBUMIN 2.8*   PROT 6.0      K 3.1*   CO2 37*   CL 93*   BUN 15   CREATININE 0.6   ALKPHOS 72   ALT 32   AST 22   BILITOT 0.4      Coagulation:   No results for input(s): INR, APTT in the last 72 hours.    Invalid input(s): PT  LDH:  No results for input(s): LDH in the last 72 hours.  Microbiology:  Microbiology Results (last 7 days)     Procedure Component Value Units Date/Time    Blood culture [[id number]] Collected:  07/21/17 1842    Order Status:  Completed Specimen:  Blood Updated:  07/22/17 0315     Blood Culture, Routine No Growth to date    Narrative:       Collection has been rescheduled by [email or web address] at 7/21/2017 16:33 Reason:   patient in ultrasound   Collection has been rescheduled by CAB2 at 7/21/2017 18:07 Reason:   follow up on pt, still in CT  Collection has been rescheduled by KMG1 at 7/21/2017 16:33 Reason:   patient in ultrasound   Collection has been rescheduled by CAB2 at 7/21/2017 18:07 Reason:   follow up on pt, still in CT    Blood culture [[id number]] Collected:  07/21/17 1631    Order Status:  Completed Specimen:  Blood Updated:  07/22/17 0115     Blood Culture, Routine No Growth to date    Urine culture [[id number]] Collected:  07/21/17 2122    Order Status:  Sent  Specimen:  Urine from Urine, Clean Catch Updated:  07/21/17 2145    Blood culture [[id number]] Collected:  07/18/17 0911    Order Status:  Completed Specimen:  Blood Updated:  07/21/17 1212     Blood Culture, Routine No Growth to date     Blood Culture, Routine No Growth to date     Blood Culture, Routine No Growth to date     Blood Culture, Routine No Growth to date    Blood culture [[id number]] Collected:  07/18/17 0911    Order Status:  Completed Specimen:  Blood Updated:  07/21/17 1212     Blood Culture, Routine No Growth to date     Blood Culture, Routine No Growth to date     Blood Culture, Routine No Growth to date     Blood Culture, Routine No Growth to date    Urine culture [[id number]] Collected:  07/18/17 0958    Order Status:  Completed Specimen:  Urine from Urine, Clean Catch Updated:  07/20/17 1112     Urine Culture, Routine --     COAGULASE-NEGATIVE STAPHYLOCOCCUS SPECIES  10,000 - 49,999 cfu/ml  Susceptibility testing not routinely performed.      Blood culture [[id number]] Collected:  07/15/17 0330    Order Status:  Completed Specimen:  Blood from Peripheral, Antecubital, Right Updated:  07/20/17 0915     Blood Culture, Routine Gram stain aer bottle: Gram positive cocci in clusters resembling Staph      Blood Culture, Routine Results called to and read back by: Damaris Malave RN 07/18/2017  08:24     Blood Culture, Routine --     MICROCOCCUS SPECIES  Organism is a probable contaminant      Blood culture [[id number]] Collected:  07/15/17 0345    Order Status:  Completed Specimen:  Blood from Peripheral, Antecubital, Left Updated:  07/20/17 0812     Blood Culture, Routine No growth after 5 days.          I have reviewed all pertinent labs within the past 24 hours.    Estimated Creatinine Clearance: 84.9 mL/min (based on Cr of 0.6).    Diagnostic Results:  I have reviewed and interpreted all pertinent imaging results/findings within the past 24 hours.    Assessment and Plan:     * Pulmonary hypertension    -  WHO class III  - PFTs - severe obstruction  - V/Q low probability for PE  - PASP 66, RAP 3 (TTE 7/15/17)  - RHC (7/18/17) - RA 10 RV 90/10, PA 80/30 (47), PWP 15, CO 4.81, CI 2.95; post NO 20PPM PA 78/30 (46); NO 40PPM PA 74/30 (45); NO 80PPM PA 78/25 (43) with CO 5.73, CI 3.52, PVR 5.2 wood units  - nebs ATC/PRN  - c/w prednisone  - Pulmonary/ID doubt pneumonia, abx stopped -- consults appreciated (Bcx 7/15 gram statin revealed micrococcus which is contaminant)  - c/w lasix 80 mg PO BID  - 2 gm NA/1500 mL fluid restric/ I/Os / Dw  - MORE, Anti dsDNA, Anti SSA, SSB, AHA, CCP, Rheum factor negative, C3 75, C4 9 (low)  - c/w ICS/LABA        Cor pulmonale    - c/w lasix 80 mg PO BID  - KCl supplementation 40 meq QD   - I/Os, daily weights  - O2 PRN  - PT/OT        Delirium due to another medical condition, acute, hypoactive    -Appreciate psych recs  -likely delirium due to chronic disease, new underlying organic problem  -denies SI/HI at this point; no plan for suicide  -does not meet inpatient criteria and does not need to be PEC'd.  -increase to Prozac 20mg daily  -start Zyprexa 2.5 mg PO/IM q6hr PRN agitation  -continue to manage medical condition and assess/treat pain  -recommend 1:1 sitter at bedside for agitation and to monitor patient as she is a fall risk and still trying to get out of bed  -please avoid/minimize use of benzos, anticholinergics, antihistaminics (H1 and H2 blockers), opiates when possible as they may worsen or exacerbate delirium  -please keep shades open and lights on during day, shades closed and lights off at night to encourage normal sleep/wake cycle. Encourage family to be present as much as possible and staff to re-orient patient throughout the day as needed. Replaced glasses/hearing aids if patient wears these devices regularly.  -please try to minimize the use of physical restraints as they can worsen agitation; utilize chemical restraints first.  -c/w PT/OT  -recommend assessment for  delirium every 4 hours while awake with CAM-ICU  -continue to look for etiology of AMS: r/o autoimmune causes, infectious causes (consider/repeat blood and urine cx, HIV, RPR) obtain CXR, TSH, b12/folate levels, ammonia level. Recommend head imaging (CT/MRI brain), consider EEG to r/o status epilepticus or rule out possible sedative/etoh withdrawal if patient has a history of EtOH abuse  - work-up negative        Acute on chronic respiratory failure    - O2 PRN  - due to RHF/COPD        Centrilobular emphysema    - c/w nebs  - c/w prednisone  - c/w albuterol/tiotropium/LABA/ICS  - pulm input appreciated        Remote left internal capsule CVA  - start ASA 81 mg QD    D/c home today. The patient and her  report they have the equipment necessary. DME ordered.  orders placed. The patient notified that since she will not follow up here, she need to arrange follow up with a pulmonologist and cardiologist close to her home. Check daily weights and avoid salt.    Discussed with attending.    Florina De La Torre MD  Heart Transplant  [facility]

## 2017-07-22 NOTE — PROGRESS NOTES
Reviewing DC papers with PT and Pt's . Pt  requesting a change of pharmacy and for a prescription for Spiriva. Dr Pride notified.

## 2017-07-22 NOTE — PROGRESS NOTES
MD Teixeira notified regarding ABG results after pt returned from CT scan. MD states he already saw them and spoke to respiratory regarding the results. No new orders at this time, WCTM

## 2017-07-22 NOTE — PROGRESS NOTES
Pt given home medication regiment and education . Pt verbalized understanding of DC medication regiment. Pt confirmed pharmacy (after changes made). Pt instructed on future appointments. Pt informed of [facility] nurse hotline. IV taken out and Tele removed. PT DC'd per physician order.

## 2017-07-23 LAB
BACTERIA BLD CULT: NORMAL
BACTERIA BLD CULT: NORMAL

## 2017-07-24 LAB
BACTERIA UR CULT: NORMAL
CRYOGLOB SER QL: NORMAL

## 2017-07-25 NOTE — PHYSICIAN QUERY
"PT Name: Joy Black  MR #: [id number]    Physician Query Form - Heart  Condition Clarification     CDS/: Kim Mock               Contact information: [phone]  This form is a permanent document in the medical record.     Query Date: July 25, 2017    By submitting this query, we are merely seeking further clarification of documentation. Please utilize your independent clinical judgment when addressing the question(s) below.    The medical record contains the following   Indicators     Supporting Clinical Findings Location in Medical Record   xx  Labs   xx EF See echo     Radiology findings       xx Echo Results  Normal L Vent Syst 55-60 EF  Right ventricular enlargement with moderately to severely depressed systolic function. Prominent moderater band  Pulmonary Htn     ECHO    "Ascites" documented      "SOB" or "MARIN" documented     xx "Hypoxia" documented  COPD exacerb COPD exacerb H&P, PN, DS   xx Heart Failure documented  R heart failure, ADHF Multiple PN, DS     "Edema" documented     xx Diuretics/Meds Furosemide 10mg/hr 7/14-7/16, 20mg/hr 7/16, 80mg IV inject 7/14-7/20 MAR    Treatment:      Other:          Provider, please specify diagnosis or diagnoses associated with above clinical findings.                               [  ] Acute Systolic Heart Failure ( EF < 40)*  [  ] Acute on Chronic Systolic Heart Failure ( EF < 40)*  [  ] Chronic Systolic Heart Failure (EF < 40)*  [  ] Acute Diastolic Heart Failure ( EF > 40)*  [ XXXXXX ] Acute on Chronic Diastolic Heart Failure( EF > 40)*  [  ] Chronic Diastolic Heart Failure (EF > 40)*  [  ] Acute Combined Systolic and Diastolic Heart Failure  [  ] Acute on Chronic Combined Systolic and Diastolic Heart Failure  [  ] Chronic Combined Systolic and Diastolic Heart Failure  [  ] Other Type of Heart Failure (please specify type): _________________________  [  ] Heart Failure Ruled Out  [  ] Other (please specify): ___________________________________  [  " ] Clinically Undetermined            *American Heart Association                                                                                                          Please document in your progress notes daily for the duration of treatment until resolved and include in your discharge summary.

## 2017-07-26 LAB
BACTERIA BLD CULT: NORMAL
BACTERIA BLD CULT: NORMAL

## 2017-07-29 NOTE — PT/OT/SLP DISCHARGE
Physical Therapy Discharge Summary    Joy Black  MRN: [id number]   Pulmonary hypertension   Patient Discharged from acute Physical Therapy on 17.  Please refer to prior PT noted date on 17 for functional status.     Assessment:   Patient has not met goals.  GOALS:    Physical Therapy Goals     Not on file          Multidisciplinary Problems (Resolved)        Problem: Physical Therapy Goal    Goal Priority Disciplines Outcome Goal Variances Interventions   Physical Therapy Goal   (Resolved)     PT/OT, PT Outcome(s) achieved     Description:  Goals to be met by: 17     Patient will increase functional independence with mobility by performin. Supine to sit with Modified [place]-not met  2. Sit to supine with Modified [place]- not met  3. Sit to stand transfer with Supervision- not met  4. Gait  x 50 feet with Stand-by Assistance using Rolling Walker or appropriate AD. - not met  5. Lower extremity exercise program x15 reps per handout, with supervision - not met  6. Pt's  to demonstrate good understanding of (A) pt prn upon d/c - not met  7. Pt to ascend/descend 4 steps with HHA and min assist for support.- not met                       Reasons for Discontinuation of Therapy Services  Transfer to alternate level of care.      Plan:  Patient Discharged to: Home with Home Health Service   Jaci Vann, PT  .
